# Patient Record
Sex: FEMALE | Race: WHITE | Employment: OTHER | URBAN - METROPOLITAN AREA
[De-identification: names, ages, dates, MRNs, and addresses within clinical notes are randomized per-mention and may not be internally consistent; named-entity substitution may affect disease eponyms.]

---

## 2017-12-04 ENCOUNTER — OFFICE VISIT (OUTPATIENT)
Dept: INTERNAL MEDICINE CLINIC | Age: 62
End: 2017-12-04

## 2017-12-04 VITALS
HEART RATE: 108 BPM | WEIGHT: 146 LBS | BODY MASS INDEX: 23.46 KG/M2 | OXYGEN SATURATION: 92 % | HEIGHT: 66 IN | DIASTOLIC BLOOD PRESSURE: 100 MMHG | SYSTOLIC BLOOD PRESSURE: 150 MMHG | TEMPERATURE: 98.6 F | RESPIRATION RATE: 18 BRPM

## 2017-12-04 DIAGNOSIS — M67.431 GANGLION OF RIGHT WRIST: ICD-10-CM

## 2017-12-04 DIAGNOSIS — R73.01 IFG (IMPAIRED FASTING GLUCOSE): ICD-10-CM

## 2017-12-04 DIAGNOSIS — Z12.31 ENCOUNTER FOR SCREENING MAMMOGRAM FOR MALIGNANT NEOPLASM OF BREAST: ICD-10-CM

## 2017-12-04 DIAGNOSIS — R03.0 ELEVATED BLOOD PRESSURE READING: ICD-10-CM

## 2017-12-04 DIAGNOSIS — M24.9 JOINT DERANGEMENT, HAND: ICD-10-CM

## 2017-12-04 DIAGNOSIS — M72.0 DUPUYTREN'S DISEASE OF PALM: ICD-10-CM

## 2017-12-04 DIAGNOSIS — Z00.00 PHYSICAL EXAM, ANNUAL: Primary | ICD-10-CM

## 2017-12-04 LAB
BILIRUB UR QL STRIP: NEGATIVE
GLUCOSE UR-MCNC: NEGATIVE MG/DL
KETONES P FAST UR STRIP-MCNC: NEGATIVE MG/DL
PH UR STRIP: 6 [PH] (ref 4.6–8)
PROT UR QL STRIP: NEGATIVE
SP GR UR STRIP: 1.02 (ref 1–1.03)
UA UROBILINOGEN AMB POC: NORMAL (ref 0.2–1)
URINALYSIS CLARITY POC: CLEAR
URINALYSIS COLOR POC: YELLOW
URINE BLOOD POC: NORMAL
URINE LEUKOCYTES POC: NEGATIVE
URINE NITRITES POC: NEGATIVE

## 2017-12-04 NOTE — PROGRESS NOTES
HISTORY OF PRESENT ILLNESS  Janet Montejo is a 65 y.o. female presents to establish care and for evaluation of the following  HPI  Former PCP, Neda Grissom MD    Here because of bilateral hand pain    Would like referral to Dr. Vanita Boswell for evaluation of self diagnosed Dupuytren's disorder of palms    Finger joints enlarge, was told it was arthritis     Colonoscopy remote, within 5 years, Jennifer Treviño, +polyp, father with colon cander    Mammogram , Dr. Raymond Yoon, last mammogram 2016, normal    August 2017 had flu vaccine    No shingles vacccine     Psychosocial: , nulligravida, MARTIR Secondary to endometosis, ovarinan cyst. Situational anxiety, no depression. Lives between [place] and [facility]. No tobacco, social alcohol    Right breast cyst removed, benign    Past Medical History:   Diagnosis Date    Arthritis     osteoporosis    Other ill-defined conditions(799.89)     high cholesterol    Psychiatric disorder     anxious     No current outpatient prescriptions on file prior to visit. No current facility-administered medications on file prior to visit. Past Surgical History:   Procedure Laterality Date    BREAST SURGERY PROCEDURE UNLISTED      HX GYN      hysterectomy     Past Surgical History:   Procedure Laterality Date    BREAST SURGERY PROCEDURE UNLISTED      HX GYN      hysterectomy     Review of Systems   Constitutional: Negative. Eyes:        Reading glasses   Respiratory: Negative. Cardiovascular: Negative for chest pain, palpitations, orthopnea and leg swelling. Genitourinary: Negative. Musculoskeletal:        Bilateral palmar pain   Skin: Negative. Neurological: Negative. Psychiatric/Behavioral: The patient is nervous/anxious (situational). Physical Exam   Constitutional: She appears well-developed and well-nourished. No distress.    HENT:   Right Ear: External ear normal.   Left Ear: External ear normal.   Nose: Nose normal. Mouth/Throat: Oropharynx is clear and moist. No oropharyngeal exudate. Eyes: Conjunctivae are normal. Right eye exhibits no discharge. Left eye exhibits no discharge. Neck: Normal range of motion. Neck supple. Cardiovascular: Normal rate, regular rhythm and normal heart sounds. Pulmonary/Chest: Effort normal and breath sounds normal. Right breast exhibits no inverted nipple, no mass, no nipple discharge, no skin change and no tenderness. Left breast exhibits no inverted nipple, no mass, no nipple discharge, no skin change and no tenderness. Breasts are symmetrical.       Abdominal: Soft. Bowel sounds are normal.   Musculoskeletal:        Hands:  Skin: She is not diaphoretic. ASSESSMENT and PLAN    ICD-10-CM ICD-9-CM    1. Physical exam, annual Z00.00 V70.0 LIPID PANEL      METABOLIC PANEL, COMPREHENSIVE      CBC WITH AUTOMATED DIFF   2. Dupuytren's disease of palm M72.0 728.6 REFERRAL TO ORTHOPEDIC SURGERY   3. Joint derangement, hand M24.9 718.94 RHEUMATOID FACTOR, QL      TSH RFX ON ABNORMAL TO FREE T4   4. IFG (impaired fasting glucose) R73.01 790.21 HEMOGLOBIN A1C WITH EAG      AMB POC URINALYSIS DIP STICK AUTO W/O MICRO   5. Encounter for screening mammogram for malignant neoplasm of breast Z12.31 V76.12 TOMMY MAMMO BI SCREENING INCL CAD   6. Elevated blood pressure reading R03.0 796.2    7. Ganglion of right wrist M67.431 727.41 REFERRAL TO ORTHOPEDIC SURGERY     Follow-up Disposition:  Return in about 4 weeks (around 1/1/2018) for blood pressure.   reviewed diet, exercise and weight control  use of aspirin to prevent MI and TIA's discussed

## 2017-12-04 NOTE — PROGRESS NOTES
RM#7  Chief Complaint   Patient presents with    Complete Physical     pain in both hands     1. Have you been to the ER, urgent care clinic since your last visit? Hospitalized since your last visit? No    2. Have you seen or consulted any other health care providers outside of the [address] [place] [address] since your last visit? Include any pap smears or colon screening. No  There are no preventive care reminders to display for this patient.

## 2017-12-04 NOTE — PATIENT INSTRUCTIONS
Dupuytren's Contracture: Care Instructions  Your Care Instructions    In Dupuytren's contracture, the fingers become stiff and curl toward the palm. It is caused by thick tissue that grows under the skin in the palm of the hand. Sometimes the condition affects the palm but not the fingers. If the tissue gets thicker and affects one or more fingers, it may limit movement of your fingers and hand. Sometimes the condition can occur in the soles of the feet. The cause of Dupuytren's disease is not known. Dupuytren's disease may get worse slowly. If you have mild Dupuytren's disease, you may be able to keep your fingers moving with regular stretching. Surgery usually helps in severe cases. However, Dupuytren's disease can come back. Follow-up care is a key part of your treatment and safety. Be sure to make and go to all appointments, and call your doctor if you are having problems. It's also a good idea to know your test results and keep a list of the medicines you take. How can you care for yourself at home? · Follow your doctor's advice for physical or occupational therapy and exercises to put your fingers and hand through a range of motion. · Two times a day, massage your hand and gently stretch the fingers back. This can get rid of tightness and help keep your fingers flexible. · Try to avoid curling your hand tightly. For example, use utensils andtools that have larger hand . When should you call for help? Call your doctor now or seek immediate medical care if:  ? · You have numbness in your fingers. ? · You have a wound or sore on your finger or palm. ? · Your hand or fingers get worse. ? Watch closely for changes in your health, and be sure to contact your doctor if you have any problems. Where can you learn more? Go to http://vance-regino.info/. Enter T883 in the search box to learn more about \"Dupuytren's Contracture: Care Instructions. \"  Current as of: March 21, 2017  Content Version: 11.4  © 1922-0123 [employer]. Care instructions adapted under license by [employer] (which disclaims liability or warranty for this information). If you have questions about a medical condition or this instruction, always ask your healthcare professional. Darrian Montoya any warranty or liability for your use of this information. Well Visit, Women 48 to 72: Care Instructions  Your Care Instructions    Physical exams can help you stay healthy. Your doctor has checked your overall health and may have suggested ways to take good care of yourself. He or she also may have recommended tests. At home, you can help prevent illness with healthy eating, regular exercise, and other steps. Follow-up care is a key part of your treatment and safety. Be sure to make and go to all appointments, and call your doctor if you are having problems. It's also a good idea to know your test results and keep a list of the medicines you take. How can you care for yourself at home? · Reach and stay at a healthy weight. This will lower your risk for many problems, such as obesity, diabetes, heart disease, and high blood pressure. · Get at least 30 minutes of exercise on most days of the week. Walking is a good choice. You also may want to do other activities, such as running, swimming, cycling, or playing tennis or team sports. · Do not smoke. Smoking can make health problems worse. If you need help quitting, talk to your doctor about stop-smoking programs and medicines. These can increase your chances of quitting for good. · Protect your skin from too much sun. When you're outdoors from 10 a.m. to 4 p.m., stay in the shade or cover up with clothing and a hat with a wide brim. Wear sunglasses that block UV rays. Even when it's cloudy, put broad-spectrum sunscreen (SPF 30 or higher) on any exposed skin.   · See a dentist one or two times a year for checkups and to have your teeth cleaned. · Wear a seat belt in the car. · Limit alcohol to 1 drink a day. Too much alcohol can cause health problems. Follow your doctor's advice about when to have certain tests. These tests can spot problems early. · Cholesterol. Your doctor will tell you how often to have this done based on your age, family history, or other things that can increase your risk for heart attack and stroke. · Blood pressure. Have your blood pressure checked during a routine doctor visit. Your doctor will tell you how often to check your blood pressure based on your age, your blood pressure results, and other factors. · Mammogram. Ask your doctor how often you should have a mammogram, which is an X-ray of your breasts. A mammogram can spot breast cancer before it can be felt and when it is easiest to treat. · Pap test and pelvic exam. Ask your doctor how often you should have a Pap test. You may not need to have a Pap test as often as you used to. · Vision. Have your eyes checked every year or two or as often as your doctor suggests. Some experts recommend that you have yearly exams for glaucoma and other age-related eye problems starting at age 48. · Hearing. Tell your doctor if you notice any change in your hearing. You can have tests to find out how well you hear. · Diabetes. Ask your doctor whether you should have tests for diabetes. · Colon cancer. You should begin tests for colon cancer at age 48. You may have one of several tests. Your doctor will tell you how often to have tests based on your age and risk. Risks include whether you already had a precancerous polyp removed from your colon or whether your parents, sisters and brothers, or children have had colon cancer. · Thyroid disease. Talk to your doctor about whether to have your thyroid checked as part of a regular physical exam. Women have an increased chance of a thyroid problem. · Osteoporosis. You should begin tests for bone density at age 72. If you are younger than 72, ask your doctor whether you have factors that may increase your risk for this disease. You may want to have this test before age 72. · Heart attack and stroke risk. At least every 4 to 6 years, you should have your risk for heart attack and stroke assessed. Your doctor uses factors such as your age, blood pressure, cholesterol, and whether you smoke or have diabetes to show what your risk for a heart attack or stroke is over the next 10 years. When should you call for help? Watch closely for changes in your health, and be sure to contact your doctor if you have any problems or symptoms that concern you. Where can you learn more? Go to http://vance-regino.info/. Enter E371 in the search box to learn more about \"Well Visit, Women 50 to 72: Care Instructions. \"  Current as of: May 12, 2017  Content Version: 11.4  © 5773-1919 [employer]. Care instructions adapted under license by [employer] (which disclaims liability or warranty for this information). If you have questions about a medical condition or this instruction, always ask your healthcare professional. [address] any warranty or liability for your use of this information.

## 2017-12-05 ENCOUNTER — TELEPHONE (OUTPATIENT)
Dept: INTERNAL MEDICINE CLINIC | Age: 62
End: 2017-12-05

## 2017-12-05 DIAGNOSIS — R74.8 ELEVATED LIVER ENZYMES: Primary | ICD-10-CM

## 2017-12-05 DIAGNOSIS — D75.89 MACROCYTOSIS: ICD-10-CM

## 2017-12-05 LAB
ALBUMIN SERPL-MCNC: 4.9 G/DL (ref 3.6–4.8)
ALBUMIN/GLOB SERPL: 1.6 {RATIO} (ref 1.2–2.2)
ALP SERPL-CCNC: 107 IU/L (ref 39–117)
ALT SERPL-CCNC: 46 IU/L (ref 0–32)
AST SERPL-CCNC: 74 IU/L (ref 0–40)
BASOPHILS # BLD AUTO: 0 X10E3/UL (ref 0–0.2)
BASOPHILS NFR BLD AUTO: 1 %
BILIRUB SERPL-MCNC: 0.5 MG/DL (ref 0–1.2)
BUN SERPL-MCNC: 9 MG/DL (ref 8–27)
BUN/CREAT SERPL: 16 (ref 12–28)
CALCIUM SERPL-MCNC: 9.8 MG/DL (ref 8.7–10.3)
CHLORIDE SERPL-SCNC: 97 MMOL/L (ref 96–106)
CHOLEST SERPL-MCNC: 289 MG/DL (ref 100–199)
CO2 SERPL-SCNC: 24 MMOL/L (ref 18–29)
CREAT SERPL-MCNC: 0.56 MG/DL (ref 0.57–1)
EOSINOPHIL # BLD AUTO: 0 X10E3/UL (ref 0–0.4)
EOSINOPHIL NFR BLD AUTO: 0 %
ERYTHROCYTE [DISTWIDTH] IN BLOOD BY AUTOMATED COUNT: 14.5 % (ref 12.3–15.4)
EST. AVERAGE GLUCOSE BLD GHB EST-MCNC: 105 MG/DL
GLOBULIN SER CALC-MCNC: 3 G/DL (ref 1.5–4.5)
GLUCOSE SERPL-MCNC: 95 MG/DL (ref 65–99)
HBA1C MFR BLD: 5.3 % (ref 4.8–5.6)
HCT VFR BLD AUTO: 46.4 % (ref 34–46.6)
HDLC SERPL-MCNC: 65 MG/DL
HGB BLD-MCNC: 15.8 G/DL (ref 11.1–15.9)
IMM GRANULOCYTES # BLD: 0 X10E3/UL (ref 0–0.1)
IMM GRANULOCYTES NFR BLD: 0 %
LDLC SERPL CALC-MCNC: ABNORMAL MG/DL (ref 0–99)
LYMPHOCYTES # BLD AUTO: 1.8 X10E3/UL (ref 0.7–3.1)
LYMPHOCYTES NFR BLD AUTO: 29 %
MCH RBC QN AUTO: 34.1 PG (ref 26.6–33)
MCHC RBC AUTO-ENTMCNC: 34.1 G/DL (ref 31.5–35.7)
MCV RBC AUTO: 100 FL (ref 79–97)
MONOCYTES # BLD AUTO: 0.4 X10E3/UL (ref 0.1–0.9)
MONOCYTES NFR BLD AUTO: 6 %
NEUTROPHILS # BLD AUTO: 4 X10E3/UL (ref 1.4–7)
NEUTROPHILS NFR BLD AUTO: 64 %
PLATELET # BLD AUTO: 303 X10E3/UL (ref 150–379)
POTASSIUM SERPL-SCNC: 3.9 MMOL/L (ref 3.5–5.2)
PROT SERPL-MCNC: 7.9 G/DL (ref 6–8.5)
RBC # BLD AUTO: 4.64 X10E6/UL (ref 3.77–5.28)
RHEUMATOID FACT SERPL-ACNC: 11.1 IU/ML (ref 0–13.9)
SODIUM SERPL-SCNC: 139 MMOL/L (ref 134–144)
TRIGL SERPL-MCNC: 992 MG/DL (ref 0–149)
TSH SERPL DL<=0.005 MIU/L-ACNC: 1.58 UIU/ML (ref 0.45–4.5)
VLDLC SERPL CALC-MCNC: ABNORMAL MG/DL (ref 5–40)
WBC # BLD AUTO: 6.2 X10E3/UL (ref 3.4–10.8)

## 2017-12-07 ENCOUNTER — TELEPHONE (OUTPATIENT)
Dept: INTERNAL MEDICINE CLINIC | Age: 62
End: 2017-12-07

## 2017-12-07 DIAGNOSIS — R74.8 ELEVATED LIVER ENZYMES: Primary | ICD-10-CM

## 2017-12-07 LAB
HAV IGM SERPL QL IA: NEGATIVE
HBV CORE IGM SERPL QL IA: NEGATIVE
HBV SURFACE AG SERPL QL IA: NEGATIVE
HCV AB S/CO SERPL IA: <0.1 S/CO RATIO (ref 0–0.9)
SPECIMEN STATUS REPORT, ROLRST: NORMAL
VIT B12 SERPL-MCNC: 674 PG/ML (ref 232–1245)

## 2017-12-07 NOTE — TELEPHONE ENCOUNTER
Please advise triglyceride cholesterol is very high, red blood cells are large and liver enzymes are elevated. Please ask how much alcohol she drinks. This could be a result of too much alcohol.    Liver ultrasound ordered

## 2017-12-08 NOTE — TELEPHONE ENCOUNTER
----- Message from Yevgeniy Wells sent at 12/8/2017  3:09 PM EST -----  Regarding: JOSELITO Blue/Telephone  Pt returned phone call.     Contact [phone]

## 2017-12-11 NOTE — TELEPHONE ENCOUNTER
Spoke with patient after verifying name and  regarding Carmen Blue's recommendations. Writer informed patient of Angelica Blue's recommendations. Patient given an opportunity to ask questions, repeated information, and verbalized understanding.

## 2017-12-14 ENCOUNTER — HOSPITAL ENCOUNTER (OUTPATIENT)
Dept: MAMMOGRAPHY | Age: 62
Discharge: HOME | End: 2017-12-14
Attending: NURSE PRACTITIONER
Payer: COMMERCIAL

## 2017-12-14 ENCOUNTER — HOSPITAL ENCOUNTER (OUTPATIENT)
Dept: ULTRASOUND IMAGING | Age: 62
Discharge: HOME | End: 2017-12-14
Attending: NURSE PRACTITIONER
Payer: COMMERCIAL

## 2017-12-14 DIAGNOSIS — Z12.31 ENCOUNTER FOR SCREENING MAMMOGRAM FOR MALIGNANT NEOPLASM OF BREAST: ICD-10-CM

## 2017-12-14 DIAGNOSIS — R74.8 ELEVATED LIVER ENZYMES: ICD-10-CM

## 2017-12-14 PROCEDURE — 77067 SCR MAMMO BI INCL CAD: CPT

## 2017-12-14 PROCEDURE — 76700 US EXAM ABDOM COMPLETE: CPT

## 2018-01-11 ENCOUNTER — TELEPHONE (OUTPATIENT)
Dept: INTERNAL MEDICINE CLINIC | Age: 63
End: 2018-01-11

## 2018-01-11 RX ORDER — ATORVASTATIN CALCIUM 20 MG/1
20 TABLET, FILM COATED ORAL DAILY
Qty: 30 TAB | Refills: 2 | Status: SHIPPED | OUTPATIENT
Start: 2018-01-11 | End: 2020-04-13

## 2018-01-11 NOTE — TELEPHONE ENCOUNTER
Spoke with patient after verifying name and  regarding Carmen Blue's recommendations. Writer informed patient of Niya Blue's recommendations. Patient stated that she doesn't want to take Lipitor due to it causing her to have diabetic symptoms. Patient would like to be prescribed a different medication. Patient given an opportunity to ask questions, repeated information, and verbalized understanding.

## 2020-04-13 ENCOUNTER — APPOINTMENT (OUTPATIENT)
Dept: GENERAL RADIOLOGY | Age: 65
DRG: 871 | End: 2020-04-13
Attending: STUDENT IN AN ORGANIZED HEALTH CARE EDUCATION/TRAINING PROGRAM
Payer: COMMERCIAL

## 2020-04-13 ENCOUNTER — APPOINTMENT (OUTPATIENT)
Dept: ULTRASOUND IMAGING | Age: 65
DRG: 871 | End: 2020-04-13
Attending: PHYSICIAN ASSISTANT
Payer: COMMERCIAL

## 2020-04-13 ENCOUNTER — HOSPITAL ENCOUNTER (INPATIENT)
Age: 65
LOS: 3 days | Discharge: HOME | DRG: 871 | End: 2020-04-16
Attending: EMERGENCY MEDICINE | Admitting: INTERNAL MEDICINE
Payer: COMMERCIAL

## 2020-04-13 ENCOUNTER — APPOINTMENT (OUTPATIENT)
Dept: CT IMAGING | Age: 65
DRG: 871 | End: 2020-04-13
Attending: INTERNAL MEDICINE
Payer: COMMERCIAL

## 2020-04-13 DIAGNOSIS — K92.0 HEMATEMESIS WITH NAUSEA: ICD-10-CM

## 2020-04-13 DIAGNOSIS — R50.9 ACUTE FEBRILE ILLNESS: ICD-10-CM

## 2020-04-13 DIAGNOSIS — K92.2 UPPER GI BLEED: Primary | ICD-10-CM

## 2020-04-13 PROBLEM — R11.2 NAUSEA & VOMITING: Status: ACTIVE | Noted: 2020-04-13

## 2020-04-13 PROBLEM — R79.89 ELEVATED LFTS: Status: ACTIVE | Noted: 2020-04-13

## 2020-04-13 PROBLEM — R19.7 NAUSEA VOMITING AND DIARRHEA: Status: ACTIVE | Noted: 2020-04-13

## 2020-04-13 PROBLEM — F10.10 ALCOHOL ABUSE: Status: ACTIVE | Noted: 2020-04-13

## 2020-04-13 LAB
ABO + RH BLD: NORMAL
ALBUMIN SERPL-MCNC: 3.4 G/DL (ref 3.5–5)
ALBUMIN/GLOB SERPL: 0.8 {RATIO} (ref 1.1–2.2)
ALP SERPL-CCNC: 148 U/L (ref 45–117)
ALT SERPL-CCNC: 68 U/L (ref 12–78)
ANION GAP SERPL CALC-SCNC: 6 MMOL/L (ref 5–15)
APPEARANCE UR: CLEAR
AST SERPL-CCNC: 122 U/L (ref 15–37)
BACTERIA URNS QL MICRO: NEGATIVE /HPF
BASOPHILS # BLD: 0 K/UL (ref 0–0.1)
BASOPHILS NFR BLD: 0 % (ref 0–1)
BILIRUB SERPL-MCNC: 1.4 MG/DL (ref 0.2–1)
BILIRUB UR QL CFM: POSITIVE
BLOOD GROUP ANTIBODIES SERPL: NORMAL
BUN SERPL-MCNC: 30 MG/DL (ref 6–20)
BUN/CREAT SERPL: 45 (ref 12–20)
CALCIUM SERPL-MCNC: 9.1 MG/DL (ref 8.5–10.1)
CHLORIDE SERPL-SCNC: 104 MMOL/L (ref 97–108)
CO2 SERPL-SCNC: 28 MMOL/L (ref 21–32)
COLOR UR: ABNORMAL
CREAT SERPL-MCNC: 0.67 MG/DL (ref 0.55–1.02)
CRP SERPL-MCNC: 1.51 MG/DL (ref 0–0.6)
DIFFERENTIAL METHOD BLD: ABNORMAL
EOSINOPHIL # BLD: 0 K/UL (ref 0–0.4)
EOSINOPHIL NFR BLD: 0 % (ref 0–7)
EPITH CASTS URNS QL MICRO: ABNORMAL /LPF
ERYTHROCYTE [DISTWIDTH] IN BLOOD BY AUTOMATED COUNT: 12.6 % (ref 11.5–14.5)
FERRITIN SERPL-MCNC: 260 NG/ML (ref 8–252)
GLOBULIN SER CALC-MCNC: 4.4 G/DL (ref 2–4)
GLUCOSE SERPL-MCNC: 166 MG/DL (ref 65–100)
GLUCOSE UR STRIP.AUTO-MCNC: NEGATIVE MG/DL
HCT VFR BLD AUTO: 34.8 % (ref 35–47)
HEMOCCULT STL QL: POSITIVE
HGB BLD-MCNC: 11.9 G/DL (ref 11.5–16)
HGB UR QL STRIP: ABNORMAL
HYALINE CASTS URNS QL MICRO: ABNORMAL /LPF (ref 0–5)
IMM GRANULOCYTES # BLD AUTO: 0 K/UL (ref 0–0.04)
IMM GRANULOCYTES NFR BLD AUTO: 0 % (ref 0–0.5)
INR PPP: 1.4 (ref 0.9–1.1)
KETONES UR QL STRIP.AUTO: ABNORMAL MG/DL
LACTATE SERPL-SCNC: 1.3 MMOL/L (ref 0.4–2)
LACTATE SERPL-SCNC: 2.3 MMOL/L (ref 0.4–2)
LDH SERPL L TO P-CCNC: 184 U/L (ref 81–246)
LEUKOCYTE ESTERASE UR QL STRIP.AUTO: ABNORMAL
LYMPHOCYTES # BLD: 0.9 K/UL (ref 0.8–3.5)
LYMPHOCYTES NFR BLD: 11 % (ref 12–49)
MCH RBC QN AUTO: 34.8 PG (ref 26–34)
MCHC RBC AUTO-ENTMCNC: 34.2 G/DL (ref 30–36.5)
MCV RBC AUTO: 101.8 FL (ref 80–99)
MONOCYTES # BLD: 0.9 K/UL (ref 0–1)
MONOCYTES NFR BLD: 12 % (ref 5–13)
NEUTS SEG # BLD: 5.9 K/UL (ref 1.8–8)
NEUTS SEG NFR BLD: 77 % (ref 32–75)
NITRITE UR QL STRIP.AUTO: NEGATIVE
NRBC # BLD: 0 K/UL (ref 0–0.01)
NRBC BLD-RTO: 0 PER 100 WBC
PH UR STRIP: 7 [PH] (ref 5–8)
PLATELET # BLD AUTO: 135 K/UL (ref 150–400)
PMV BLD AUTO: 11 FL (ref 8.9–12.9)
POTASSIUM SERPL-SCNC: 3.1 MMOL/L (ref 3.5–5.1)
PROCALCITONIN SERPL-MCNC: 0.46 NG/ML
PROT SERPL-MCNC: 7.8 G/DL (ref 6.4–8.2)
PROT UR STRIP-MCNC: ABNORMAL MG/DL
PROTHROMBIN TIME: 14 SEC (ref 9–11.1)
RBC # BLD AUTO: 3.42 M/UL (ref 3.8–5.2)
RBC #/AREA URNS HPF: ABNORMAL /HPF (ref 0–5)
SODIUM SERPL-SCNC: 138 MMOL/L (ref 136–145)
SP GR UR REFRACTOMETRY: 1.03 (ref 1–1.03)
SPECIMEN EXP DATE BLD: NORMAL
TROPONIN I SERPL-MCNC: <0.05 NG/ML
UROBILINOGEN UR QL STRIP.AUTO: 1 EU/DL (ref 0.2–1)
WBC # BLD AUTO: 7.8 K/UL (ref 3.6–11)
WBC URNS QL MICRO: ABNORMAL /HPF (ref 0–4)

## 2020-04-13 PROCEDURE — 96361 HYDRATE IV INFUSION ADD-ON: CPT

## 2020-04-13 PROCEDURE — 36415 COLL VENOUS BLD VENIPUNCTURE: CPT

## 2020-04-13 PROCEDURE — 74177 CT ABD & PELVIS W/CONTRAST: CPT

## 2020-04-13 PROCEDURE — 80053 COMPREHEN METABOLIC PANEL: CPT

## 2020-04-13 PROCEDURE — 94761 N-INVAS EAR/PLS OXIMETRY MLT: CPT

## 2020-04-13 PROCEDURE — 87635 SARS-COV-2 COVID-19 AMP PRB: CPT

## 2020-04-13 PROCEDURE — 74011250636 HC RX REV CODE- 250/636: Performed by: INTERNAL MEDICINE

## 2020-04-13 PROCEDURE — 83605 ASSAY OF LACTIC ACID: CPT

## 2020-04-13 PROCEDURE — 99285 EMERGENCY DEPT VISIT HI MDM: CPT

## 2020-04-13 PROCEDURE — 86900 BLOOD TYPING SEROLOGIC ABO: CPT

## 2020-04-13 PROCEDURE — 74011250636 HC RX REV CODE- 250/636: Performed by: STUDENT IN AN ORGANIZED HEALTH CARE EDUCATION/TRAINING PROGRAM

## 2020-04-13 PROCEDURE — 76700 US EXAM ABDOM COMPLETE: CPT

## 2020-04-13 PROCEDURE — 82272 OCCULT BLD FECES 1-3 TESTS: CPT

## 2020-04-13 PROCEDURE — 74011250636 HC RX REV CODE- 250/636: Performed by: EMERGENCY MEDICINE

## 2020-04-13 PROCEDURE — 86140 C-REACTIVE PROTEIN: CPT

## 2020-04-13 PROCEDURE — C9113 INJ PANTOPRAZOLE SODIUM, VIA: HCPCS | Performed by: EMERGENCY MEDICINE

## 2020-04-13 PROCEDURE — 74011250637 HC RX REV CODE- 250/637: Performed by: INTERNAL MEDICINE

## 2020-04-13 PROCEDURE — 89055 LEUKOCYTE ASSESSMENT FECAL: CPT

## 2020-04-13 PROCEDURE — 84484 ASSAY OF TROPONIN QUANT: CPT

## 2020-04-13 PROCEDURE — 85025 COMPLETE CBC W/AUTO DIFF WBC: CPT

## 2020-04-13 PROCEDURE — 74011000258 HC RX REV CODE- 258: Performed by: STUDENT IN AN ORGANIZED HEALTH CARE EDUCATION/TRAINING PROGRAM

## 2020-04-13 PROCEDURE — 81001 URINALYSIS AUTO W/SCOPE: CPT

## 2020-04-13 PROCEDURE — 82728 ASSAY OF FERRITIN: CPT

## 2020-04-13 PROCEDURE — 87506 IADNA-DNA/RNA PROBE TQ 6-11: CPT

## 2020-04-13 PROCEDURE — 83615 LACTATE (LD) (LDH) ENZYME: CPT

## 2020-04-13 PROCEDURE — 84145 PROCALCITONIN (PCT): CPT

## 2020-04-13 PROCEDURE — 71045 X-RAY EXAM CHEST 1 VIEW: CPT

## 2020-04-13 PROCEDURE — 85610 PROTHROMBIN TIME: CPT

## 2020-04-13 PROCEDURE — 74011000250 HC RX REV CODE- 250: Performed by: INTERNAL MEDICINE

## 2020-04-13 PROCEDURE — C9113 INJ PANTOPRAZOLE SODIUM, VIA: HCPCS | Performed by: INTERNAL MEDICINE

## 2020-04-13 PROCEDURE — 96365 THER/PROPH/DIAG IV INF INIT: CPT

## 2020-04-13 PROCEDURE — 96375 TX/PRO/DX INJ NEW DRUG ADDON: CPT

## 2020-04-13 PROCEDURE — 65660000001 HC RM ICU INTERMED STEPDOWN

## 2020-04-13 PROCEDURE — 74011636320 HC RX REV CODE- 636/320: Performed by: EMERGENCY MEDICINE

## 2020-04-13 PROCEDURE — 87040 BLOOD CULTURE FOR BACTERIA: CPT

## 2020-04-13 PROCEDURE — 87177 OVA AND PARASITES SMEARS: CPT

## 2020-04-13 PROCEDURE — 0107U C DIFF TOX AG DETCJ IA STOOL: CPT

## 2020-04-13 RX ORDER — ACETAMINOPHEN 325 MG/1
650 TABLET ORAL
Status: DISCONTINUED | OUTPATIENT
Start: 2020-04-13 | End: 2020-04-16 | Stop reason: HOSPADM

## 2020-04-13 RX ORDER — LORAZEPAM 2 MG/ML
4 INJECTION INTRAMUSCULAR
Status: DISCONTINUED | OUTPATIENT
Start: 2020-04-13 | End: 2020-04-16 | Stop reason: HOSPADM

## 2020-04-13 RX ORDER — SODIUM CHLORIDE 0.9 % (FLUSH) 0.9 %
10 SYRINGE (ML) INJECTION
Status: COMPLETED | OUTPATIENT
Start: 2020-04-13 | End: 2020-04-13

## 2020-04-13 RX ORDER — ACETAMINOPHEN 325 MG/1
325 TABLET ORAL
COMMUNITY
End: 2020-04-22

## 2020-04-13 RX ORDER — METRONIDAZOLE 500 MG/100ML
500 INJECTION, SOLUTION INTRAVENOUS EVERY 8 HOURS
Status: DISCONTINUED | OUTPATIENT
Start: 2020-04-13 | End: 2020-04-15

## 2020-04-13 RX ORDER — ONDANSETRON 2 MG/ML
4 INJECTION INTRAMUSCULAR; INTRAVENOUS
Status: DISCONTINUED | OUTPATIENT
Start: 2020-04-13 | End: 2020-04-16 | Stop reason: HOSPADM

## 2020-04-13 RX ORDER — SODIUM CHLORIDE 9 MG/ML
150 INJECTION, SOLUTION INTRAVENOUS ONCE
Status: DISPENSED | OUTPATIENT
Start: 2020-04-13 | End: 2020-04-14

## 2020-04-13 RX ORDER — BARIUM SULFATE 20 MG/ML
900 SUSPENSION ORAL
Status: DISPENSED | OUTPATIENT
Start: 2020-04-13 | End: 2020-04-14

## 2020-04-13 RX ORDER — SODIUM CHLORIDE 0.9 % (FLUSH) 0.9 %
5-40 SYRINGE (ML) INJECTION EVERY 8 HOURS
Status: DISCONTINUED | OUTPATIENT
Start: 2020-04-13 | End: 2020-04-16 | Stop reason: HOSPADM

## 2020-04-13 RX ORDER — PANTOPRAZOLE SODIUM 40 MG/10ML
40 INJECTION, POWDER, LYOPHILIZED, FOR SOLUTION INTRAVENOUS
Status: COMPLETED | OUTPATIENT
Start: 2020-04-13 | End: 2020-04-13

## 2020-04-13 RX ORDER — SODIUM CHLORIDE 9 MG/ML
100 INJECTION, SOLUTION INTRAVENOUS CONTINUOUS
Status: DISCONTINUED | OUTPATIENT
Start: 2020-04-13 | End: 2020-04-14

## 2020-04-13 RX ORDER — ONDANSETRON 2 MG/ML
4 INJECTION INTRAMUSCULAR; INTRAVENOUS
Status: COMPLETED | OUTPATIENT
Start: 2020-04-13 | End: 2020-04-13

## 2020-04-13 RX ORDER — FAMOTIDINE 20 MG/1
20 TABLET, FILM COATED ORAL
COMMUNITY
End: 2020-04-16

## 2020-04-13 RX ORDER — ASCORBIC ACID 500 MG
1000 TABLET ORAL DAILY
COMMUNITY

## 2020-04-13 RX ORDER — ASPIRIN 81 MG/1
81 TABLET ORAL
COMMUNITY
End: 2020-04-16

## 2020-04-13 RX ORDER — SODIUM CHLORIDE 0.9 % (FLUSH) 0.9 %
5-40 SYRINGE (ML) INJECTION AS NEEDED
Status: DISCONTINUED | OUTPATIENT
Start: 2020-04-13 | End: 2020-04-16 | Stop reason: HOSPADM

## 2020-04-13 RX ORDER — CHOLECALCIFEROL (VITAMIN D3) 125 MCG
CAPSULE ORAL DAILY
COMMUNITY

## 2020-04-13 RX ORDER — ACETAMINOPHEN 650 MG/1
650 SUPPOSITORY RECTAL
Status: DISCONTINUED | OUTPATIENT
Start: 2020-04-13 | End: 2020-04-16 | Stop reason: HOSPADM

## 2020-04-13 RX ORDER — ACETAMINOPHEN 325 MG/1
650 TABLET ORAL
Status: DISCONTINUED | OUTPATIENT
Start: 2020-04-13 | End: 2020-04-13

## 2020-04-13 RX ORDER — POTASSIUM CHLORIDE 7.45 MG/ML
10 INJECTION INTRAVENOUS
Status: COMPLETED | OUTPATIENT
Start: 2020-04-13 | End: 2020-04-13

## 2020-04-13 RX ORDER — LORAZEPAM 2 MG/ML
2 INJECTION INTRAMUSCULAR
Status: DISCONTINUED | OUTPATIENT
Start: 2020-04-13 | End: 2020-04-16 | Stop reason: HOSPADM

## 2020-04-13 RX ADMIN — ACETAMINOPHEN 650 MG: 325 TABLET, FILM COATED ORAL at 19:02

## 2020-04-13 RX ADMIN — CEFTRIAXONE SODIUM 2 G: 2 INJECTION, POWDER, FOR SOLUTION INTRAMUSCULAR; INTRAVENOUS at 14:27

## 2020-04-13 RX ADMIN — SODIUM CHLORIDE 40 MG: 9 INJECTION INTRAMUSCULAR; INTRAVENOUS; SUBCUTANEOUS at 22:12

## 2020-04-13 RX ADMIN — SODIUM CHLORIDE 1000 ML: 900 INJECTION, SOLUTION INTRAVENOUS at 14:13

## 2020-04-13 RX ADMIN — POTASSIUM CHLORIDE 10 MEQ: 10 INJECTION, SOLUTION INTRAVENOUS at 22:45

## 2020-04-13 RX ADMIN — POTASSIUM CHLORIDE 10 MEQ: 10 INJECTION, SOLUTION INTRAVENOUS at 17:40

## 2020-04-13 RX ADMIN — SODIUM CHLORIDE 100 ML/HR: 900 INJECTION, SOLUTION INTRAVENOUS at 17:26

## 2020-04-13 RX ADMIN — POTASSIUM CHLORIDE 10 MEQ: 10 INJECTION, SOLUTION INTRAVENOUS at 18:58

## 2020-04-13 RX ADMIN — Medication 10 ML: at 22:13

## 2020-04-13 RX ADMIN — ONDANSETRON 4 MG: 2 INJECTION INTRAMUSCULAR; INTRAVENOUS at 14:12

## 2020-04-13 RX ADMIN — Medication 10 ML: at 19:03

## 2020-04-13 RX ADMIN — METRONIDAZOLE 500 MG: 500 INJECTION, SOLUTION INTRAVENOUS at 19:00

## 2020-04-13 RX ADMIN — POTASSIUM CHLORIDE 10 MEQ: 10 INJECTION, SOLUTION INTRAVENOUS at 20:17

## 2020-04-13 RX ADMIN — PANTOPRAZOLE SODIUM 40 MG: 40 INJECTION, POWDER, FOR SOLUTION INTRAVENOUS at 14:12

## 2020-04-13 RX ADMIN — IOPAMIDOL 100 ML: 755 INJECTION, SOLUTION INTRAVENOUS at 22:00

## 2020-04-13 NOTE — PROGRESS NOTES
Nurse Clarification of the Prior to Admission Medication Regimen     The patient was interviewed regarding clarification of the prior to admission medication regimen. The individual(s) listed above were questioned regarding the patient's use of any other inhalers, topical products, over the counter medications, herbal medications, vitamin products or ophthalmic/nasal/otic medication use. Information Obtained From: patient    Recommendations/Findings: The following amendments were made to the patient's active medication list on file at [facility]:     1) Additions:    multivitamins po every day prn   Vitamin c 1000 mg po prn qd   Vitamin d3 po prn every day   Vitamin k po every day prn   pepcid ac every day prn    Tylenol 325 mg po q4h prn   Aspirin 81 mg po q6 hr prn. 2) Removals:    none    3) Changes:   none       PTA medication list was corrected to the following:     Prior to Admission Medications   Prescriptions Last Dose Informant Taking? MULTIVITAMIN PO   Yes   Sig: Take  by mouth daily as needed. acetaminophen (TylenoL) 325 mg tablet   Yes   Sig: Take 325 mg by mouth every four (4) hours as needed for Pain. ascorbic acid, vitamin C, (Vitamin C) 500 mg tablet   Yes   Sig: Take 1,000 mg by mouth daily. aspirin delayed-release 81 mg tablet   Yes   Sig: Take 81 mg by mouth daily as needed for Pain. cholecalciferol, vitamin D3, (Vitamin D3) 50 mcg (2,000 unit) tab   Yes   Sig: Take  by mouth daily as needed. famotidine (Pepcid AC) 20 mg tablet   Yes   Sig: Take 20 mg by mouth daily as needed for Heartburn. Indications: heartburn   phytonadione, vit K1, (VITAMIN K1)   Yes   Sig: by Does Not Apply route daily as needed.       Facility-Administered Medications: None        Thank you,  Joyce Fontaine

## 2020-04-13 NOTE — PROGRESS NOTES
Bedside and Verbal shift change report GIVEN TO SUBHA lynn. Report included the following information SBAR, Kardex, ED Summary, Procedure Summary, Intake/Output, MAR and Recent Results. 645 pm: pt arrived to unit and was tucked in.

## 2020-04-13 NOTE — PROGRESS NOTES
TRANSFER - IN REPORT:    Verbal report received from rocío (name) on Paul Mean  being received from ED (unit) for routine progression of care      Report consisted of patients Situation, Background, Assessment and   Recommendations(SBAR). Information from the following report(s) SBAR, Kardex, ED Summary, Procedure Summary, Intake/Output, MAR and Recent Results was reviewed with the receiving nurse. Opportunity for questions and clarification was provided. Assessment completed upon patients arrival to unit and care assumed.

## 2020-04-13 NOTE — PROGRESS NOTES
04/13/20 1855   Vital Signs   Temp 100.4 °F (38 °C)   Temp Source Oral   Pulse (Heart Rate) (!) 119   Resp Rate 21   O2 Sat (%) 97 %   Level of Consciousness Alert   /84   MAP (Calculated) 106   MEWS Score 4   tach and temp elevating MEWs. Will monitor for increase in vitals, as this tach is a decrease. Will medicate for fever.

## 2020-04-13 NOTE — PROGRESS NOTES
Primary Nurse Sara Sam RN and yves RN performed a dual skin assessment on this patient Impairment noted- see wound doc flow sheet    Psoriasis    Jose G score is 19

## 2020-04-13 NOTE — ED PROVIDER NOTES
EMERGENCY DEPARTMENT HISTORY AND PHYSICAL EXAM          Date: 4/13/2020  Patient Name: Valentino Riling  Attending of Record: Dr. Jack Bautista    History of Presenting Illness     Chief Complaint   Patient presents with    Vomiting       History Provided By: Patient    HPI: Valentino Riling is a 62 y.o. female, pmhx significant for hiatal hernia, hyperlipidemia, and prediabetes, who presents via triage to the ED c/o hematemesis. Patient started having nausea, vomiting and diarrhea yesterday morning. Around 9 PM, had one episode of hematemesis. Today, has had 2 more episodes of hematemesis. Vomitus consists of bright red blood and blood clots. Additionally, during his period has had very dark stools. Associated symptoms of tachycardia diaphoresis, and paleness. Denies any syncope or presyncope, sensation of dizziness or lightheadedness, chest pain, abdominal pain, history of easy bruising. patient does not take any anticoagulants. PCP: Dea Smith NP    There are no other complaints, changes, or physical findings at this time. Current Facility-Administered Medications   Medication Dose Route Frequency Provider Last Rate Last Dose    0.9% sodium chloride infusion  150 mL/hr IntraVENous ONCE Lion Monzon MD         Current Outpatient Medications   Medication Sig Dispense Refill    MULTIVITAMIN PO Take  by mouth daily as needed.  ascorbic acid, vitamin C, (Vitamin C) 500 mg tablet Take 1,000 mg by mouth daily.  cholecalciferol, vitamin D3, (Vitamin D3) 50 mcg (2,000 unit) tab Take  by mouth daily as needed.  phytonadione, vit K1, (VITAMIN K1) by Does Not Apply route daily as needed.  famotidine (Pepcid AC) 20 mg tablet Take 20 mg by mouth daily as needed for Heartburn. Indications: heartburn      acetaminophen (TylenoL) 325 mg tablet Take 325 mg by mouth every four (4) hours as needed for Pain.       aspirin delayed-release 81 mg tablet Take 81 mg by mouth daily as needed for Pain. Past History     Past Medical History:  Past Medical History:   Diagnosis Date    Arthritis     osteoporosis    Other ill-defined conditions(799.89)     high cholesterol    Psychiatric disorder     anxious       Past Surgical History:  Past Surgical History:   Procedure Laterality Date    BREAST SURGERY PROCEDURE UNLISTED      HX BREAST BIOPSY Right     at age 25   [de-identified] GYN      hysterectomy    HX HYSTERECTOMY      HX OOPHORECTOMY         Family History:  Family History   Problem Relation Age of Onset   Gloriajean Seeds Stroke Mother    Jb Momin Mother     Glaucoma Mother     Colon Cancer Father         64    Hypertension Brother     Breast Cancer Paternal Aunt        Social History:  Social History     Tobacco Use    Smoking status: Never Smoker    Smokeless tobacco: Never Used   Substance Use Topics    Alcohol use: Yes     Comment: social    Drug use: No       Allergies: Allergies   Allergen Reactions    Amoxicillin Hives         Review of Systems   Review of Systems   Constitutional: Positive for chills, diaphoresis, fatigue and fever. Respiratory: Negative for shortness of breath and wheezing. Cardiovascular: Negative for chest pain and leg swelling. Gastrointestinal: Positive for blood in stool, diarrhea, nausea and vomiting. Negative for abdominal distention and abdominal pain. Hematemesis   Genitourinary: Negative for decreased urine volume and dysuria. Skin: Positive for color change and pallor. Negative for rash. Neurological: Negative for dizziness, syncope and light-headedness. Physical Exam   Physical Exam  Vitals signs reviewed. Constitutional:       General: She is not in acute distress. Appearance: She is diaphoretic. She is not toxic-appearing. HENT:      Head: Normocephalic. Nose: No congestion.       Mouth/Throat:      Mouth: Mucous membranes are moist.      Comments: No central pallor  Eyes:      Comments: Pale conjunctiva   Neck: Musculoskeletal: No neck rigidity. Cardiovascular:      Rate and Rhythm: Regular rhythm. Tachycardia present. Pulses: Normal pulses. Pulmonary:      Effort: Pulmonary effort is normal.      Breath sounds: Normal breath sounds. Abdominal:      General: Abdomen is flat. Bowel sounds are normal.      Palpations: Abdomen is soft. Musculoskeletal:         General: No swelling. Skin:     Capillary Refill: Capillary refill takes 2 to 3 seconds. Coloration: Skin is pale. Skin is not jaundiced. Findings: No bruising. Neurological:      General: No focal deficit present. Mental Status: She is alert and oriented to person, place, and time. Psychiatric:         Mood and Affect: Mood normal.         Diagnostic Study Results     Labs -     Recent Results (from the past 12 hour(s))   CBC WITH AUTOMATED DIFF    Collection Time: 04/13/20  1:56 PM   Result Value Ref Range    WBC 7.8 3.6 - 11.0 K/uL    RBC 3.42 (L) 3.80 - 5.20 M/uL    HGB 11.9 11.5 - 16.0 g/dL    HCT 34.8 (L) 35.0 - 47.0 %    .8 (H) 80.0 - 99.0 FL    MCH 34.8 (H) 26.0 - 34.0 PG    MCHC 34.2 30.0 - 36.5 g/dL    RDW 12.6 11.5 - 14.5 %    PLATELET 327 (L) 115 - 400 K/uL    MPV 11.0 8.9 - 12.9 FL    NRBC 0.0 0  WBC    ABSOLUTE NRBC 0.00 0.00 - 0.01 K/uL    NEUTROPHILS 77 (H) 32 - 75 %    LYMPHOCYTES 11 (L) 12 - 49 %    MONOCYTES 12 5 - 13 %    EOSINOPHILS 0 0 - 7 %    BASOPHILS 0 0 - 1 %    IMMATURE GRANULOCYTES 0 0.0 - 0.5 %    ABS. NEUTROPHILS 5.9 1.8 - 8.0 K/UL    ABS. LYMPHOCYTES 0.9 0.8 - 3.5 K/UL    ABS. MONOCYTES 0.9 0.0 - 1.0 K/UL    ABS. EOSINOPHILS 0.0 0.0 - 0.4 K/UL    ABS. BASOPHILS 0.0 0.0 - 0.1 K/UL    ABS. IMM.  GRANS. 0.0 0.00 - 0.04 K/UL    DF AUTOMATED     METABOLIC PANEL, COMPREHENSIVE    Collection Time: 04/13/20  1:56 PM   Result Value Ref Range    Sodium 138 136 - 145 mmol/L    Potassium 3.1 (L) 3.5 - 5.1 mmol/L    Chloride 104 97 - 108 mmol/L    CO2 28 21 - 32 mmol/L    Anion gap 6 5 - 15 mmol/L Glucose 166 (H) 65 - 100 mg/dL    BUN 30 (H) 6 - 20 MG/DL    Creatinine 0.67 0.55 - 1.02 MG/DL    BUN/Creatinine ratio 45 (H) 12 - 20      GFR est AA >60 >60 ml/min/1.73m2    GFR est non-AA >60 >60 ml/min/1.73m2    Calcium 9.1 8.5 - 10.1 MG/DL    Bilirubin, total 1.4 (H) 0.2 - 1.0 MG/DL    ALT (SGPT) 68 12 - 78 U/L    AST (SGOT) 122 (H) 15 - 37 U/L    Alk. phosphatase 148 (H) 45 - 117 U/L    Protein, total 7.8 6.4 - 8.2 g/dL    Albumin 3.4 (L) 3.5 - 5.0 g/dL    Globulin 4.4 (H) 2.0 - 4.0 g/dL    A-G Ratio 0.8 (L) 1.1 - 2.2     TYPE & SCREEN    Collection Time: 04/13/20  1:56 PM   Result Value Ref Range    Crossmatch Expiration 04/16/2020     ABO/Rh(D) A POSITIVE     Antibody screen NEG    PROTHROMBIN TIME + INR    Collection Time: 04/13/20  2:05 PM   Result Value Ref Range    INR 1.4 (H) 0.9 - 1.1      Prothrombin time 14.0 (H) 9.0 - 11.1 sec   URINALYSIS W/ RFLX MICROSCOPIC    Collection Time: 04/13/20  2:05 PM   Result Value Ref Range    Color DARK YELLOW      Appearance CLEAR CLEAR      Specific gravity 1.026 1.003 - 1.030      pH (UA) 7.0 5.0 - 8.0      Protein TRACE (A) NEG mg/dL    Glucose Negative NEG mg/dL    Ketone TRACE (A) NEG mg/dL    Blood TRACE (A) NEG      Urobilinogen 1.0 0.2 - 1.0 EU/dL    Nitrites Negative NEG      Leukocyte Esterase MODERATE (A) NEG      WBC 5-10 0 - 4 /hpf    RBC 5-10 0 - 5 /hpf    Epithelial cells FEW FEW /lpf    Bacteria Negative NEG /hpf    Hyaline cast 2-5 0 - 5 /lpf   BILIRUBIN, CONFIRM    Collection Time: 04/13/20  2:05 PM   Result Value Ref Range    Bilirubin UA, confirm Positive (A) NEG     LACTIC ACID    Collection Time: 04/13/20  2:17 PM   Result Value Ref Range    Lactic acid 2.3 (HH) 0.4 - 2.0 MMOL/L   OCCULT BLOOD, STOOL    Collection Time: 04/13/20  2:18 PM   Result Value Ref Range    Occult blood, stool Positive (A) NEG         Radiologic Studies -   XR CHEST PORT   Final Result   IMPRESSION: No evidence of active intrathoracic disease.         CT Results  (Last 48 hours)    None        CXR Results  (Last 48 hours)               04/13/20 1531  XR CHEST PORT Final result    Impression:  IMPRESSION: No evidence of active intrathoracic disease. Narrative:  Portable chest single view dated 4/13/2020       Comparison chest dated 2/3/2014       History is hemoptysis and fever       A single frontal view of the chest was obtained. The cardiac silhouette is   normal in size. There is no evidence of active lung disease. Medical Decision Making   I am the first provider for this patient. I reviewed the vital signs, available nursing notes, past medical history, past surgical history, family history and social history. Vital Signs-Reviewed the patient's vital signs. Patient Vitals for the past 12 hrs:   Temp Pulse Resp BP SpO2   04/13/20 1430 -- (!) 123 25 146/88 96 %   04/13/20 1403 (!) 100.5 °F (38.1 °C) -- -- -- --   04/13/20 1400 -- (!) 149 20 (!) 155/96 96 %   04/13/20 1339 98.6 °F (37 °C) (!) 138 12 (!) 166/98 --       Records Reviewed: Old Medical Records    Provider Notes (Medical Decision Making):     DDx: Gastroenteritis, gastritis, Jen-Vaughan tear, bleeding peptic ulcer, esophageal varices, malignancy      Patient is diaphoretic tachycardic, febrile, and pale appearing. No syncope or presyncope. Ordered broad laboratory work-up including type and screen, CBC, CMP. Will give pantoprazole, Zofran, and ceftriaxone empirically. Patient will need admission to the hospital.  Further management pending initial results. Will attempt to obtain information regarding who her outpatient gastroenterologist is to contact prior to admission. 1550: Comfortably in no acute distress. I spoke with hospitalist, who agrees with need admission. Gastroenterology is at bedside. ED Course and Progress Notes:   Initial assessment performed.  The patients presenting problems have been discussed, and they are in agreement with the care plan formulated and outlined with them. I have encouraged them to ask questions as they arise throughout their visit. Diagnosis     Clinical Impression:   1. Upper GI bleed    2. Hematemesis with nausea    3. Acute febrile illness          Disposition:  admit    PLAN:  1. Current Discharge Medication List        2. Follow-up Information    None       Return to ED if worse         ORLANDO Nguyen MD

## 2020-04-13 NOTE — CONSULTS
Gastroenterology Consultation Note  MARIIA Chiang   for Dr. Amy Li    NAME: Nikki Reid : 1955 MRN: [id number]   ATTG: TBD PCP: Fabian Jane NP  Date/Time:  2020 3:08 PM  Subjective:   REASON FOR CONSULT:      Nikki Reid is a 62 y.o.  female who I was asked to see for hematemesis. She reports this all started yesterday morning. She admits to nausea and vomiting after eating [employer]. However she was ill prior and has been laying in bed for days, reports consatent diarrhea. Bleeding started at midnight, BRB that was a clot/gillian that started at midnight. At 9:30 AM more blood came up and was more brown appearing and not as bright. She admits to chills and diaphoresis. She has a little SOB today prompting 911 call. Some mucous production and taking Cleratin for allergies. Has arthritis, no myalgias. She takes a probiotic, liquid. Two spoon fulls BID. She used to take Naproxen 10+ years ago, no other NSAID use. The diarrhea started yesterday morning. Just finally calmed down,  It is dark brown/black appearing, that started after hematemesis. No sick contacts or recent travel. No known contacts with COVID-19. She has been trying to keep 6 ft distance and limiting contacts with others. She had contact with EMS a week ago after her  had a fall. No abx use, county water but drinks bottled water. Hgb is stable at 11.9 but lower than baseline of 14-15, .8, plt 135, bili elevated at 1.4, INR 1.4. CXR is unremarkable. EGD by Dr. Peg Moore 3/11/14: Impressions:    Normal mucosa in the second part of the duodenum and duodenal bulb. (Biopsy). Normal mucosa in the stomach. (DEEPTI-Test). Mucosa suggestive of Parks's esophagus (Biopsy). Hiatal Hernia in the cardia. Path: mild reflux changes, no Parks's    Her last colonoscopy was a year ago by Dr. Sigrid Trivedi.  She thought she had one more recently with polyps however nothing leatha in [facility]. Her farther had colon cancer in late 40s-50s. He had exposures in [employer] to biological warfare. He also had polyps. No other family hx of stomach or esophageal cancer. No tobacco use, she reports drinking more than usual recently. She was drinking one glass a day of wine. Now drinking 5-6 glasses of Maker's a day over the last 4-6 weeks. She has gone months without drinking, no withdrawals in the past.  She attributes drinking to COVID-19 outbreak. Past Medical History:   Diagnosis Date    Arthritis     osteoporosis    Other ill-defined conditions(979.89)     high cholesterol    Psychiatric disorder     anxious      Past Surgical History:   Procedure Laterality Date    BREAST SURGERY PROCEDURE UNLISTED      HX BREAST BIOPSY Right     at age 25   Revonda Do GYN      hysterectomy    HX HYSTERECTOMY      HX OOPHORECTOMY       Social History     Tobacco Use    Smoking status: Never Smoker    Smokeless tobacco: Never Used   Substance Use Topics    Alcohol use: Yes     Comment: social      Family History   Problem Relation Age of Onset    Stroke Mother    Marlyne Miu Mother     Glaucoma Mother     Colon Cancer Father         64    Hypertension Brother     Breast Cancer Paternal Aunt       Allergies   Allergen Reactions    Amoxicillin Hives      Home Medications:  Prior to Admission Medications   Prescriptions Last Dose Informant Patient Reported? Taking? MULTIVITAMIN PO   Yes Yes   Sig: Take  by mouth daily as needed. acetaminophen (TylenoL) 325 mg tablet   Yes Yes   Sig: Take 325 mg by mouth every four (4) hours as needed for Pain. ascorbic acid, vitamin C, (Vitamin C) 500 mg tablet   Yes Yes   Sig: Take 1,000 mg by mouth daily. aspirin delayed-release 81 mg tablet   Yes Yes   Sig: Take 81 mg by mouth daily as needed for Pain. cholecalciferol, vitamin D3, (Vitamin D3) 50 mcg (2,000 unit) tab   Yes Yes   Sig: Take  by mouth daily as needed.    famotidine (Pepcid AC) 20 mg tablet   Yes Yes   Sig: Take 20 mg by mouth daily as needed for Heartburn. Indications: heartburn   phytonadione, vit K1, (VITAMIN K1)   Yes Yes   Sig: by Does Not Apply route daily as needed. Facility-Administered Medications: None     Hospital medications:  Current Facility-Administered Medications   Medication Dose Route Frequency    0.9% sodium chloride infusion  150 mL/hr IntraVENous ONCE     Current Outpatient Medications   Medication Sig    MULTIVITAMIN PO Take  by mouth daily as needed.  ascorbic acid, vitamin C, (Vitamin C) 500 mg tablet Take 1,000 mg by mouth daily.  cholecalciferol, vitamin D3, (Vitamin D3) 50 mcg (2,000 unit) tab Take  by mouth daily as needed.  phytonadione, vit K1, (VITAMIN K1) by Does Not Apply route daily as needed.  famotidine (Pepcid AC) 20 mg tablet Take 20 mg by mouth daily as needed for Heartburn. Indications: heartburn    acetaminophen (TylenoL) 325 mg tablet Take 325 mg by mouth every four (4) hours as needed for Pain.  aspirin delayed-release 81 mg tablet Take 81 mg by mouth daily as needed for Pain.      REVIEW OF SYSTEMS:     []     Unable to obtain  ROS due to  []    mental status change  []    sedated   []    intubated  Review of Systems -   History obtained from the patient  General ROS: positive for  - chills and fever  Psychological ROS: negative for - anxiety or depression  Hematological and Lymphatic ROS: negative for - blood clots  Respiratory ROS: positive for - shortness of breath  Cardiovascular ROS: no chest pain or dyspnea on exertion  Gastrointestinal ROS: See HPI  Genito-Urinary ROS: no dysuria, trouble voiding, or hematuria  Musculoskeletal ROS: positive for - joint pain  Neurological ROS: no TIA or stroke symptoms  Dermatological ROS: positive for - Psoriasis    Objective:   VITALS:    Visit Vitals  /88   Pulse (!) 123   Temp (!) 100.5 °F (38.1 °C)   Resp 25   SpO2 96%     Temp (24hrs), Av.6 °F (37.6 °C), Min:98.6 °F (37 °C), Max:100.5 °F (38.1 °C)    PHYSICAL EXAM:   General:    Alert, cooperative, no distress, appears stated age. Head:   Normocephalic, without obvious abnormality, atraumatic. Eyes:   Conjunctivae clear, anicteric sclerae. Pupils are equal  Abdomen:   Obese, Soft, non-tender. Not distended. Bowel sounds normal. No masses. No hepatosplenomegaly. No shifting dullness. Rectal:  Deferred, heme positive stool  Extremities: No cyanosis. No edema. No clubbing  Skin:     Texture, turgor normal. No rashes/lesions/jaundice  Lymph: Cervical, supraclavicular normal.  Psych:  Good insight. Not depressed. Not anxious or agitated. Neurologic: EOMs intact. No facial asymmetry. No aphasia or slurred speech normal strength, A/O X 3. LAB DATA REVIEWED:    Lab Results   Component Value Date/Time    WBC 7.8 04/13/2020 01:56 PM    HGB 11.9 04/13/2020 01:56 PM    HCT 34.8 (L) 04/13/2020 01:56 PM    PLATELET 474 (L) 81/39/9944 01:56 PM    .8 (H) 04/13/2020 01:56 PM     Lab Results   Component Value Date/Time    ALT (SGPT) 68 04/13/2020 01:56 PM    AST (SGOT) 122 (H) 04/13/2020 01:56 PM    Alk.  phosphatase 148 (H) 04/13/2020 01:56 PM    Bilirubin, total 1.4 (H) 04/13/2020 01:56 PM     Lab Results   Component Value Date/Time    Sodium 138 04/13/2020 01:56 PM    Potassium 3.1 (L) 04/13/2020 01:56 PM    Chloride 104 04/13/2020 01:56 PM    CO2 28 04/13/2020 01:56 PM    Anion gap 6 04/13/2020 01:56 PM    Glucose 166 (H) 04/13/2020 01:56 PM    BUN 30 (H) 04/13/2020 01:56 PM    Creatinine 0.67 04/13/2020 01:56 PM    BUN/Creatinine ratio 45 (H) 04/13/2020 01:56 PM    GFR est AA >60 04/13/2020 01:56 PM    GFR est non-AA >60 04/13/2020 01:56 PM    Calcium 9.1 04/13/2020 01:56 PM     No results found for: LPSE  Lab Results   Component Value Date/Time    INR 1.4 (H) 04/13/2020 02:05 PM    INR 1.1 02/03/2014 10:35 AM    Prothrombin time 14.0 (H) 04/13/2020 02:05 PM    Prothrombin time 11.2 02/03/2014 10:35 AM       IMAGING RESULTS:  XR Results (most recent):  Results from Hospital Encounter encounter on 04/13/20   XR CHEST PORT    Narrative Portable chest single view dated 4/13/2020    Comparison chest dated 2/3/2014    History is hemoptysis and fever    A single frontal view of the chest was obtained. The cardiac silhouette is  normal in size. There is no evidence of active lung disease. Impression IMPRESSION: No evidence of active intrathoracic disease. Impression:  Active Problems:    Elevated LFTs (4/13/2020)      Hematemesis (4/13/2020)      Nausea vomiting and diarrhea (4/13/2020)      Alcohol abuse (4/13/2020)         Plan:  Patient is presenting with N/V/D, reports 3 episodes of hematemesis starting at midnight today. Hgb is stable, was febrile on presentation and diaphoretic. Unknown cause of fever. Given diarrhea we will check C. Diff as well as stool culture. Elevaed LFTs likely seocndary to increased EtOH use however we will check an abdominal US. Consider r.o of COVID-19 given N/V/D can be symptoms of virus. We will plan for EGD after etiology of fever is determined unless she has an acute unstable bleed then would preform sooner. Start on IV PPI as well as Zofran ORN for nausea.        ___________________________________________________  Care Plan discussed with:    [x]    Patient   []    Family   []    Nursing   []    Attending  Total Time :  30   minutes   ___________________________________________________  GI: MARIIA Mclaughlin       The patient was seen by me. I have discussed the case with the mid-level provider in detail. I have reviewed the patient's chart and the note. I personally performed all components of the history, physical, and medical decision making and agree with the assessment and plan, with minor modifications as noted. Please see APPs note for full details.      Physical exam:  General:AAO x 3,waringa mask,   HEENT:  EOMI,   GI: see above    Data Review: reviewed     Impression:   N/V/blood in emesis  diarrhea   Fever  Alcoholism,  Elevated LFTs    Plan and discussion:  Her hgb is 11.9 currently and she ate. With fever and generalized malaise I agree with making sure that she is r/o for infectious causes including COVID 19. In the interim we suggest CIWA protocol,  US RUQ,  PPI/Anti emetics  Avoid retching. Follow LFTs  EGD after above is ruled out( or urgently for destabilizing bleeding). Thank you for this cionsult             Signed By: Anand Menezes MD    4/13/2020  4:46 PM

## 2020-04-13 NOTE — ED NOTES
TRANSFER - OUT REPORT:    Verbal report given to [address] [place] [address] on Iesha Rodriguez  being transferred to [facility] for routine progression of care       Report consisted of patients Situation, Background, Assessment and   Recommendations(SBAR). Information from the following report(s) SBAR and ED Summary was reviewed with the receiving nurse. Lines:   Peripheral IV 04/13/20 Left Forearm (Active)   Site Assessment Clean, dry, & intact 4/13/2020  1:53 PM   Phlebitis Assessment 0 4/13/2020  1:53 PM   Infiltration Assessment 0 4/13/2020  1:53 PM   Dressing Status Clean, dry, & intact 4/13/2020  1:53 PM       Peripheral IV 04/13/20 Left Hand (Active)   Site Assessment Clean, dry, & intact 4/13/2020  5:51 PM   Phlebitis Assessment 0 4/13/2020  5:51 PM   Infiltration Assessment 0 4/13/2020  5:51 PM   Dressing Status Clean, dry, & intact 4/13/2020  5:51 PM        Opportunity for questions and clarification was provided.       Patient transported with:   Monitor

## 2020-04-13 NOTE — H&P
Hospitalist Admission Note    NAME: Teo Washington   :  1955   MRN:  [id number]     Date/Time:  2020 5:27 PM    Patient PCP: Camryn Rubin, NP  ________________________________________________________________________    My assessment of this patient's clinical condition and my plan of care is as follows. Assessment / Plan:  Nausea, vomiting, hematemesis  Diarrhea rule out infectious etiology including Covid 19  -Patient presenting with nausea, vomiting, hematemesis and also dark stools  -Due to propensity of coronavirus presenting with GI symptoms, will rule out Covid  -Start IV fluids with normal saline. Insert 2 large-bore IV lines. Start Protonix 40 mg IV twice daily. GI has been consulted. Clear liquid diet for now. Keep n.p.o. from midnight.  -Check for stool studies including C. Difficile.  -Ultrasound of abdomen was within normal limits. Will check CT abdomen due to fever.  -Initiate protocol for coronavirus including droplet, droplet plus and also contact precautions. Order inflammatory markers. Holding off on starting chloroquine for now. -Timing of endoscopy will be dependent upon results of coronavirus test    Severe sepsis  Possible UTI  Rule out colitis  -She is currently febrile and also tachycardic and lactic acid is also elevated  -Continue IV fluids. Start empiric ceftriaxone and Flagyl left. Urine and blood cultures have been sent. -Recheck lactic acid in 6 hours  --Blood cultures have been sent.  -Follow results of CT abdomen    Hypokalemia  -Replaced with KCl. Acute transaminitis   Hyperbilirubinemia  -Could be related to alcohol versus coronavirus  -Ultrasound shows normal gallbladder  -Recheck CMP in a.m.  -Total bilirubin is elevated at 1.4. INR is 1.4. Recheck in a.m. Chronic alcoholism with concern for alcohol withdrawal  -Start alcohol withdrawal protocol with Ativan.   Start banana bag.  -Telemetry monitoring            Code Status: full Surrogate Decision Maker: Spouse Thomas    DVT Prophylaxis: Scd's      Baseline: From home independent        Subjective:   CHIEF COMPLAINT: Nausea, vomiting, hematemesis    HISTORY OF PRESENT ILLNESS:     Edu Baker is a 62 y.o.   female who presents with no past medical history of osteoporosis, dyslipidemia is coming to the hospital chief complaints of nausea, vomiting, hematemesis and diarrhea since last 3 to 4 days. Patient reports being in her usual state of health until about 4 days ago when she started not feeling well. She reports that she is having nausea along with vomiting and is not able to keep anything down. She also reports no blood in her vomit. She also reports diarrhea with dark stools as well. She also reports cough with small amount of whitish phlegm. She does not report any shortness of breath. She also reports fever with chills at home. She does not report any chest pain. She does not report any exposure to sick contacts. Denies exposure to anyone with positive coronavirus infection. On arrival to the hospital, she was noted to have fever of 100.5, was tachycardic and blood pressure was also elevated at 166/98. On labs she was noted to have a hemoglobin of 11.9. BMP showed a potassium of 3.1, creatinine was normal.  ALT and AST were elevated at 68 and 122. Lactic acid was elevated at 2.3. Total bilirubin was 1.4. Chest x-ray shows no acute process. We were asked to admit for work up and evaluation of the above problems.      Past Medical History:   Diagnosis Date    Arthritis     osteoporosis    Other ill-defined conditions(149.89)     high cholesterol    Psychiatric disorder     anxious        Past Surgical History:   Procedure Laterality Date    BREAST SURGERY PROCEDURE UNLISTED      HX BREAST BIOPSY Right     at age 25   [facility]      hysterectomy    HX HYSTERECTOMY      HX OOPHORECTOMY         Social History     Tobacco Use    Smoking status: Never Smoker    Smokeless tobacco: Never Used   Substance Use Topics    Alcohol use: Yes     Comment: social        Family History   Problem Relation Age of Onset    Stroke Mother    Rasheedlinh Carcamo Mother     Glaucoma Mother     Colon Cancer Father         64    Hypertension Brother     Breast Cancer Paternal Aunt      Allergies   Allergen Reactions    Amoxicillin Hives        Prior to Admission medications    Medication Sig Start Date End Date Taking? Authorizing Provider   MULTIVITAMIN PO Take  by mouth daily as needed. Yes Jamee, MD Ramiro   ascorbic acid, vitamin C, (Vitamin C) 500 mg tablet Take 1,000 mg by mouth daily. Yes Jamee, MD Ramiro   cholecalciferol, vitamin D3, (Vitamin D3) 50 mcg (2,000 unit) tab Take  by mouth daily as needed. Yes Jamee, MD Ramiro   phytonadione, vit K1, (VITAMIN K1) by Does Not Apply route daily as needed. Yes Jamee, MD Ramiro   famotidine (Pepcid AC) 20 mg tablet Take 20 mg by mouth daily as needed for Heartburn. Indications: heartburn   Yes Jamee, MD Ramiro   acetaminophen (TylenoL) 325 mg tablet Take 325 mg by mouth every four (4) hours as needed for Pain. Yes Jamee, MD Ramiro   aspirin delayed-release 81 mg tablet Take 81 mg by mouth daily as needed for Pain. Yes Jamee, MD Ramiro       REVIEW OF SYSTEMS:     I am not able to complete the review of systems because:    The patient is intubated and sedated    The patient has altered mental status due to his acute medical problems    The patient has baseline aphasia from prior stroke(s)    The patient has baseline dementia and is not reliable historian    The patient is in acute medical distress and unable to provide information           Total of 12 systems reviewed as follows:       POSITIVE= underlined text  Negative = text not underlined  General:  fever, chills, sweats, generalized weakness, weight loss/gain,      loss of appetite   Eyes:    blurred vision, eye pain, loss of vision, double vision  ENT:    rhinorrhea, pharyngitis Respiratory:   cough, sputum production, SOB, CALLES, wheezing, pleuritic pain   Cardiology:   chest pain, palpitations, orthopnea, PND, edema, syncope   Gastrointestinal:  abdominal pain , N/V, diarrhea, dysphagia, constipation, bleeding   Genitourinary:  frequency, urgency, dysuria, hematuria, incontinence   Muskuloskeletal :  arthralgia, myalgia, back pain  Hematology:  easy bruising, nose or gum bleeding, lymphadenopathy   Dermatological: rash, ulceration, pruritis, color change / jaundice  Endocrine:   hot flashes or polydipsia   Neurological:  headache, dizziness, confusion, focal weakness, paresthesia,     Speech difficulties, memory loss, gait difficulty  Psychological: Feelings of anxiety, depression, agitation    Objective:   VITALS:    Visit Vitals  /87   Pulse (!) 135   Temp (!) 100.5 °F (38.1 °C)   Resp 12   SpO2 98%       PHYSICAL EXAM:    General:    Alert, cooperative, no distress, appears stated age. HEENT: Atraumatic, anicteric sclerae, pink conjunctivae  Neck:  symmetrical,    Lungs:   Breathing pattern is regular, no accessory muscle use  Chest wall:  No Accessory muscle use. Heart:   Tachycardic, regular  Abdomen:   Not distended  Extremities: No cyanosis. No clubbing,    Skin:     Not pale. Not Jaundiced  No rashes   Psych:  Not anxious or agitated.   Neurologic: Alert, awake, moving all 4 extremities    _______________________________________________________________________  Care Plan discussed with:    Comments   Patient y    Family      RN y    Care Manager                    Consultant:      _______________________________________________________________________  Expected  Disposition:   Home with Family y   HH/PT/OT/RN    SNF/LTC    HEATHER    ________________________________________________________________________  TOTAL TIME:  61  Minutes    Critical Care Provided     Minutes non procedure based      Comments    y Reviewed previous records   >50% of visit spent in counseling and coordination of care y Discussion with patient and/or family and questions answered       ________________________________________________________________________  Signed: Emely Bullard MD    Procedures: see electronic medical records for all procedures/Xrays and details which were not copied into this note but were reviewed prior to creation of Plan. LAB DATA REVIEWED:    Recent Results (from the past 24 hour(s))   CBC WITH AUTOMATED DIFF    Collection Time: 04/13/20  1:56 PM   Result Value Ref Range    WBC 7.8 3.6 - 11.0 K/uL    RBC 3.42 (L) 3.80 - 5.20 M/uL    HGB 11.9 11.5 - 16.0 g/dL    HCT 34.8 (L) 35.0 - 47.0 %    .8 (H) 80.0 - 99.0 FL    MCH 34.8 (H) 26.0 - 34.0 PG    MCHC 34.2 30.0 - 36.5 g/dL    RDW 12.6 11.5 - 14.5 %    PLATELET 546 (L) 997 - 400 K/uL    MPV 11.0 8.9 - 12.9 FL    NRBC 0.0 0  WBC    ABSOLUTE NRBC 0.00 0.00 - 0.01 K/uL    NEUTROPHILS 77 (H) 32 - 75 %    LYMPHOCYTES 11 (L) 12 - 49 %    MONOCYTES 12 5 - 13 %    EOSINOPHILS 0 0 - 7 %    BASOPHILS 0 0 - 1 %    IMMATURE GRANULOCYTES 0 0.0 - 0.5 %    ABS. NEUTROPHILS 5.9 1.8 - 8.0 K/UL    ABS. LYMPHOCYTES 0.9 0.8 - 3.5 K/UL    ABS. MONOCYTES 0.9 0.0 - 1.0 K/UL    ABS. EOSINOPHILS 0.0 0.0 - 0.4 K/UL    ABS. BASOPHILS 0.0 0.0 - 0.1 K/UL    ABS. IMM. GRANS. 0.0 0.00 - 0.04 K/UL    DF AUTOMATED     METABOLIC PANEL, COMPREHENSIVE    Collection Time: 04/13/20  1:56 PM   Result Value Ref Range    Sodium 138 136 - 145 mmol/L    Potassium 3.1 (L) 3.5 - 5.1 mmol/L    Chloride 104 97 - 108 mmol/L    CO2 28 21 - 32 mmol/L    Anion gap 6 5 - 15 mmol/L    Glucose 166 (H) 65 - 100 mg/dL    BUN 30 (H) 6 - 20 MG/DL    Creatinine 0.67 0.55 - 1.02 MG/DL    BUN/Creatinine ratio 45 (H) 12 - 20      GFR est AA >60 >60 ml/min/1.73m2    GFR est non-AA >60 >60 ml/min/1.73m2    Calcium 9.1 8.5 - 10.1 MG/DL    Bilirubin, total 1.4 (H) 0.2 - 1.0 MG/DL    ALT (SGPT) 68 12 - 78 U/L    AST (SGOT) 122 (H) 15 - 37 U/L    Alk.  phosphatase 148 (H) 45 - 117 U/L Protein, total 7.8 6.4 - 8.2 g/dL    Albumin 3.4 (L) 3.5 - 5.0 g/dL    Globulin 4.4 (H) 2.0 - 4.0 g/dL    A-G Ratio 0.8 (L) 1.1 - 2.2     TYPE & SCREEN    Collection Time: 04/13/20  1:56 PM   Result Value Ref Range    Crossmatch Expiration 04/16/2020     ABO/Rh(D) A POSITIVE     Antibody screen NEG    PROTHROMBIN TIME + INR    Collection Time: 04/13/20  2:05 PM   Result Value Ref Range    INR 1.4 (H) 0.9 - 1.1      Prothrombin time 14.0 (H) 9.0 - 11.1 sec   URINALYSIS W/ RFLX MICROSCOPIC    Collection Time: 04/13/20  2:05 PM   Result Value Ref Range    Color DARK YELLOW      Appearance CLEAR CLEAR      Specific gravity 1.026 1.003 - 1.030      pH (UA) 7.0 5.0 - 8.0      Protein TRACE (A) NEG mg/dL    Glucose Negative NEG mg/dL    Ketone TRACE (A) NEG mg/dL    Blood TRACE (A) NEG      Urobilinogen 1.0 0.2 - 1.0 EU/dL    Nitrites Negative NEG      Leukocyte Esterase MODERATE (A) NEG      WBC 5-10 0 - 4 /hpf    RBC 5-10 0 - 5 /hpf    Epithelial cells FEW FEW /lpf    Bacteria Negative NEG /hpf    Hyaline cast 2-5 0 - 5 /lpf   BILIRUBIN, CONFIRM    Collection Time: 04/13/20  2:05 PM   Result Value Ref Range    Bilirubin UA, confirm Positive (A) NEG     LACTIC ACID    Collection Time: 04/13/20  2:17 PM   Result Value Ref Range    Lactic acid 2.3 (HH) 0.4 - 2.0 MMOL/L   OCCULT BLOOD, STOOL    Collection Time: 04/13/20  2:18 PM   Result Value Ref Range    Occult blood, stool Positive (A) NEG

## 2020-04-14 LAB
ALBUMIN SERPL-MCNC: 3 G/DL (ref 3.5–5)
ALBUMIN/GLOB SERPL: 0.8 {RATIO} (ref 1.1–2.2)
ALP SERPL-CCNC: 124 U/L (ref 45–117)
ALT SERPL-CCNC: 51 U/L (ref 12–78)
ANION GAP SERPL CALC-SCNC: 8 MMOL/L (ref 5–15)
AST SERPL-CCNC: 82 U/L (ref 15–37)
BASOPHILS # BLD: 0 K/UL (ref 0–0.1)
BASOPHILS NFR BLD: 1 % (ref 0–1)
BILIRUB SERPL-MCNC: 1 MG/DL (ref 0.2–1)
BUN SERPL-MCNC: 20 MG/DL (ref 6–20)
BUN/CREAT SERPL: 41 (ref 12–20)
C DIFF GDH STL QL: NEGATIVE
C DIFF TOX A+B STL QL IA: NEGATIVE
CALCIUM SERPL-MCNC: 8.1 MG/DL (ref 8.5–10.1)
CAMPYLOBACTER SPECIES, DNA: NEGATIVE
CHLORIDE SERPL-SCNC: 109 MMOL/L (ref 97–108)
CO2 SERPL-SCNC: 24 MMOL/L (ref 21–32)
COMMENT, HOLDF: NORMAL
CREAT SERPL-MCNC: 0.49 MG/DL (ref 0.55–1.02)
DIFFERENTIAL METHOD BLD: ABNORMAL
ENTEROTOXIGEN E COLI, DNA: NEGATIVE
EOSINOPHIL # BLD: 0 K/UL (ref 0–0.4)
EOSINOPHIL NFR BLD: 1 % (ref 0–7)
ERYTHROCYTE [DISTWIDTH] IN BLOOD BY AUTOMATED COUNT: 12.9 % (ref 11.5–14.5)
GLOBULIN SER CALC-MCNC: 3.9 G/DL (ref 2–4)
GLUCOSE SERPL-MCNC: 108 MG/DL (ref 65–100)
HCT VFR BLD AUTO: 31.5 % (ref 35–47)
HEMOCCULT STL QL: POSITIVE
HGB BLD-MCNC: 10.4 G/DL (ref 11.5–16)
HGB BLD-MCNC: 9.5 G/DL (ref 11.5–16)
IMM GRANULOCYTES # BLD AUTO: 0 K/UL (ref 0–0.04)
IMM GRANULOCYTES NFR BLD AUTO: 0 % (ref 0–0.5)
INTERPRETATION: NORMAL
LYMPHOCYTES # BLD: 1.3 K/UL (ref 0.8–3.5)
LYMPHOCYTES NFR BLD: 21 % (ref 12–49)
MAGNESIUM SERPL-MCNC: 1.4 MG/DL (ref 1.6–2.4)
MCH RBC QN AUTO: 34.3 PG (ref 26–34)
MCHC RBC AUTO-ENTMCNC: 33 G/DL (ref 30–36.5)
MCV RBC AUTO: 104 FL (ref 80–99)
MONOCYTES # BLD: 0.6 K/UL (ref 0–1)
MONOCYTES NFR BLD: 10 % (ref 5–13)
NEUTS SEG # BLD: 4.1 K/UL (ref 1.8–8)
NEUTS SEG NFR BLD: 67 % (ref 32–75)
NRBC # BLD: 0 K/UL (ref 0–0.01)
NRBC BLD-RTO: 0 PER 100 WBC
P SHIGELLOIDES DNA STL QL NAA+PROBE: NEGATIVE
PLATELET # BLD AUTO: 114 K/UL (ref 150–400)
PMV BLD AUTO: 10.9 FL (ref 8.9–12.9)
POTASSIUM SERPL-SCNC: 3.2 MMOL/L (ref 3.5–5.1)
PROT SERPL-MCNC: 6.9 G/DL (ref 6.4–8.2)
RBC # BLD AUTO: 3.03 M/UL (ref 3.8–5.2)
SALMONELLA SPECIES, DNA: NEGATIVE
SAMPLES BEING HELD,HOLD: NORMAL
SARS-COV-2, COV2: NOT DETECTED
SHIGA TOXIN PRODUCING, DNA: NEGATIVE
SHIGELLA SP+EIEC IPAH STL QL NAA+PROBE: NEGATIVE
SODIUM SERPL-SCNC: 141 MMOL/L (ref 136–145)
SPECIMEN SOURCE, FCOV2M: NORMAL
VIBRIO SPECIES, DNA: NEGATIVE
WBC # BLD AUTO: 6.1 K/UL (ref 3.6–11)
WBC #/AREA STL HPF: NORMAL /HPF (ref 0–4)
Y. ENTEROCOLITICA, DNA: NEGATIVE

## 2020-04-14 PROCEDURE — 85018 HEMOGLOBIN: CPT

## 2020-04-14 PROCEDURE — 94760 N-INVAS EAR/PLS OXIMETRY 1: CPT

## 2020-04-14 PROCEDURE — C9113 INJ PANTOPRAZOLE SODIUM, VIA: HCPCS | Performed by: INTERNAL MEDICINE

## 2020-04-14 PROCEDURE — 65660000001 HC RM ICU INTERMED STEPDOWN

## 2020-04-14 PROCEDURE — 74011000250 HC RX REV CODE- 250: Performed by: INTERNAL MEDICINE

## 2020-04-14 PROCEDURE — 85025 COMPLETE CBC W/AUTO DIFF WBC: CPT

## 2020-04-14 PROCEDURE — 82272 OCCULT BLD FECES 1-3 TESTS: CPT

## 2020-04-14 PROCEDURE — 80053 COMPREHEN METABOLIC PANEL: CPT

## 2020-04-14 PROCEDURE — 83735 ASSAY OF MAGNESIUM: CPT

## 2020-04-14 PROCEDURE — 36415 COLL VENOUS BLD VENIPUNCTURE: CPT

## 2020-04-14 PROCEDURE — 74011000258 HC RX REV CODE- 258: Performed by: INTERNAL MEDICINE

## 2020-04-14 PROCEDURE — 74011250636 HC RX REV CODE- 250/636: Performed by: INTERNAL MEDICINE

## 2020-04-14 RX ORDER — SODIUM CHLORIDE AND POTASSIUM CHLORIDE .9; .15 G/100ML; G/100ML
SOLUTION INTRAVENOUS CONTINUOUS
Status: DISCONTINUED | OUTPATIENT
Start: 2020-04-14 | End: 2020-04-16

## 2020-04-14 RX ADMIN — CEFTRIAXONE 1 G: 1 INJECTION, POWDER, FOR SOLUTION INTRAMUSCULAR; INTRAVENOUS at 00:24

## 2020-04-14 RX ADMIN — FOLIC ACID: 5 INJECTION, SOLUTION INTRAMUSCULAR; INTRAVENOUS; SUBCUTANEOUS at 13:20

## 2020-04-14 RX ADMIN — METRONIDAZOLE 500 MG: 500 INJECTION, SOLUTION INTRAVENOUS at 11:48

## 2020-04-14 RX ADMIN — METRONIDAZOLE 500 MG: 500 INJECTION, SOLUTION INTRAVENOUS at 02:42

## 2020-04-14 RX ADMIN — Medication 10 ML: at 06:16

## 2020-04-14 RX ADMIN — METRONIDAZOLE 500 MG: 500 INJECTION, SOLUTION INTRAVENOUS at 18:32

## 2020-04-14 RX ADMIN — Medication 10 ML: at 13:21

## 2020-04-14 RX ADMIN — Medication 10 ML: at 21:38

## 2020-04-14 RX ADMIN — CEFTRIAXONE 1 G: 1 INJECTION, POWDER, FOR SOLUTION INTRAMUSCULAR; INTRAVENOUS at 23:37

## 2020-04-14 RX ADMIN — SODIUM CHLORIDE 40 MG: 9 INJECTION INTRAMUSCULAR; INTRAVENOUS; SUBCUTANEOUS at 08:14

## 2020-04-14 RX ADMIN — SODIUM CHLORIDE 100 ML/HR: 900 INJECTION, SOLUTION INTRAVENOUS at 06:16

## 2020-04-14 RX ADMIN — SODIUM CHLORIDE 40 MG: 9 INJECTION INTRAMUSCULAR; INTRAVENOUS; SUBCUTANEOUS at 21:37

## 2020-04-14 NOTE — PROGRESS NOTES
Problem: Risk for Spread of Infection  Goal: Prevent transmission of infectious organism to others  Description: Prevent the transmission of infectious organisms to other patients, staff members, and visitors. Outcome: Progressing Towards Goal     Problem: Falls - Risk of  Goal: *Absence of Falls  Description: Document Margie Ludwig Fall Risk and appropriate interventions in the flowsheet. Outcome: Progressing Towards Goal  Note: Fall Risk Interventions:                                Problem: Pressure Injury - Risk of  Goal: *Prevention of pressure injury  Description: Document Jose G Scale and appropriate interventions in the flowsheet.   Outcome: Progressing Towards Goal  Note: Pressure Injury Interventions:       Moisture Interventions: Absorbent underpads, Apply protective barrier, creams and emollients, Check for incontinence Q2 hours and as needed, Offer toileting Q_hr, Minimize layers, Maintain skin hydration (lotion/cream)    Activity Interventions: Increase time out of bed, Chair cushion, Pressure redistribution bed/mattress(bed type), PT/OT evaluation    Mobility Interventions: Chair cushion, Float heels, HOB 30 degrees or less    Nutrition Interventions: Document food/fluid/supplement intake, Discuss nutritional consult with provider    Friction and Shear Interventions: HOB 30 degrees or less, Lift sheet, Minimize layers

## 2020-04-14 NOTE — PROGRESS NOTES
Hospitalist Progress Note    NAME: Humberto Mean   :  1955   MRN:  [id number]     Interim Hospital Summary: 62 y.o. female whom presented on 2020 with      Assessment / Plan:    Severe sepsis  Possible UTI  Nausea, vomiting, hematemesis  Diarrhea with ileitis/colitis on CT  -CT abdomen  IMPRESSION:   1. There is a short segment of concentric mural thickening in the terminal ileum  which may represent an infectious/inflammatory process. 2. Short segment of concentric mural thickening in the sigmoid colon/rectum  which could represent colitis/proctitis. 3. Decompressed gallbladder with questionable gallbladder wall thickening and  possible hyperemia. 4. Hepatic steatosis with heterogeneous areas of what appears to be sparing.    -Patient presenting with nausea, vomiting, hematemesis and also dark stools  -agree to rule out COVID 19- test pending  -Check for stool studies including C. Difficile. -GI input noted  -continue empiric abx     Hypokalemia  -add kcl to IVFs     Acute transaminitis   Hyperbilirubinemia  -Could be related to alcohol versus coronavirus vs sepsis  -Ultrasound shows normal gallbladder  -follow     Chronic alcoholism with concern for alcohol withdrawal  -monitor closely. CIWA       Code Status: full   Surrogate Decision Maker: Spouse Thomas     DVT Prophylaxis: Scd's        Subjective:     Chief Complaint / Reason for Physician Visit  Follow up of NV D, LFT elevation ETOH  Chart reviewed in detail. Discussed with RN events overnight. Review of Systems:  Symptom Y/N Comments  Symptom Y/N Comments   Fever/Chills    Chest Pain     Poor Appetite    Edema     Cough    Abdominal Pain     Sputum    Joint Pain     SOB/CALLES    Pruritis/Rash     Nausea/vomit y   Tolerating PT/OT     Diarrhea y   Tolerating Diet     Constipation    Other       Could NOT obtain due to:      PO intake: No data found.   Objective:     VITALS:   Last 24hrs VS reviewed since prior progress note. Most recent are:  Patient Vitals for the past 24 hrs:   Temp Pulse Resp BP SpO2   04/14/20 1148 98.3 °F (36.8 °C) -- 18 129/70 --   04/14/20 0808 98.2 °F (36.8 °C) 86 18 130/86 99 %   04/14/20 0245 98.8 °F (37.1 °C) 99 20 134/81 98 %   04/13/20 2245 99 °F (37.2 °C) 98 20 128/74 98 %   04/13/20 1936 99.5 °F (37.5 °C) (!) 118 20 142/83 97 %   04/13/20 1855 100.4 °F (38 °C) (!) 119 21 149/84 97 %   04/13/20 1800 -- (!) 127 19 144/87 96 %   04/13/20 1730 -- (!) 132 18 142/82 97 %   04/13/20 1600 -- (!) 135 12 156/87 98 %   04/13/20 1530 -- (!) 126 17 (!) 166/91 96 %   04/13/20 1430 -- (!) 123 25 146/88 96 %       Intake/Output Summary (Last 24 hours) at 4/14/2020 1417  Last data filed at 4/14/2020 0419  Gross per 24 hour   Intake 120 ml   Output 1000 ml   Net -880 ml        PHYSICAL EXAM:  General: WD, WN. Alert, cooperative, no acute distress    EENT:  EOMI. Anicteric sclerae. MMM  Resp:  CTA bilaterally, no wheezing or rales. No accessory muscle use  CV:  Regular  rhythm,  No edema  GI:  Soft, Non distended, Non tender.  +Bowel sounds  Neurologic:  Alert and oriented X 3, normal speech,   Psych:   Good insight. Not anxious nor agitated  Skin:  No rashes. No jaundice    Reviewed most current lab test results and cultures  YES  Reviewed most current radiology test results   YES  Review and summation of old records today    NO  Reviewed patient's current orders and MAR    YES  PMH/SH reviewed - no change compared to H&P  ________________________________________________________________________  Care Plan discussed with:    Comments   Patient x    Family      RN x    Care Manager     Consultant                        Multidiciplinary team rounds were held today with , nursing, pharmacist and clinical coordinator. Patient's plan of care was discussed; medications were reviewed and discharge planning was addressed. ________________________________________________________________________  Total NON critical care TIME:  25   Minutes    Total CRITICAL CARE TIME Spent:   Minutes non procedure based      Comments   >50% of visit spent in counseling and coordination of care x     This includes time during multidisciplinary rounds if indicated above   ________________________________________________________________________  Yumi Cordova MD     Procedures: see electronic medical records for all procedures/Xrays and details which were not copied into this note but were reviewed prior to creation of Plan. LABS:  I reviewed today's most current labs and imaging studies.   Pertinent labs include:  Recent Labs     04/14/20  0825 04/14/20  0035 04/13/20  1356   WBC  --  6.1 7.8   HGB 9.5* 10.4* 11.9   HCT  --  31.5* 34.8*   PLT  --  114* 135*     Recent Labs     04/14/20  0035 04/13/20  1405 04/13/20  1356     --  138   K 3.2*  --  3.1*   *  --  104   CO2 24  --  28   *  --  166*   BUN 20  --  30*   CREA 0.49*  --  0.67   CA 8.1*  --  9.1   MG 1.4*  --   --    ALB 3.0*  --  3.4*   TBILI 1.0  --  1.4*   SGOT 82*  --  122*   ALT 51  --  68   INR  --  1.4*  --

## 2020-04-14 NOTE — PROGRESS NOTES
[id number]- member Id number for [facility]     [phone] number to call    Bedside and Verbal shift change report given to Nadine Flores (oncoming nurse) by [facility] (offgoing nurse). Report included the following information SBAR, Kardex, MAR, Accordion, Recent Results and Cardiac Rhythm low sinus tachy into the 110s with movement.

## 2020-04-14 NOTE — PROGRESS NOTES
Gastroenterology Progress Note    4/14/2020    Admit Date: 4/13/2020    Subjective: Follow up for: N/V/Blood per os/diarrhea      Telemedicine rounding was done: As per RNs notes :    \"Patient did not have any hematemesis or N/V during shift. Patient had several watery loose stools through out shift. Stool studies collected and sent to lab. \"    Hgb is lower today. Had fevers overnight. Current Facility-Administered Medications   Medication Dose Route Frequency    pantoprazole (PROTONIX) 40 mg in 0.9% sodium chloride 10 mL injection  40 mg IntraVENous Q12H    ondansetron (ZOFRAN) injection 4 mg  4 mg IntraVENous Q6H PRN    sodium chloride (NS) flush 5-40 mL  5-40 mL IntraVENous Q8H    sodium chloride (NS) flush 5-40 mL  5-40 mL IntraVENous PRN    0.9% sodium chloride infusion  100 mL/hr IntraVENous CONTINUOUS    acetaminophen (TYLENOL) tablet 650 mg  650 mg Oral Q6H PRN    Or    acetaminophen (TYLENOL) suppository 650 mg  650 mg Rectal Q6H PRN    0.9% sodium chloride 8,605 mL with folic acid 1 mg, thiamine 100 mg, mvi, adult no. 4 with vit K 10 mL infusion   IntraVENous DAILY    LORazepam (ATIVAN) injection 2 mg  2 mg IntraVENous Q1H PRN    LORazepam (ATIVAN) injection 4 mg  4 mg IntraVENous Q1H PRN    cefTRIAXone (ROCEPHIN) 1 g in 0.9% sodium chloride (MBP/ADV) 50 mL  1 g IntraVENous Q24H    metroNIDAZOLE (FLAGYL) IVPB premix 500 mg  500 mg IntraVENous Q8H        Objective:     Blood pressure 130/86, pulse 86, temperature 98.2 °F (36.8 °C), resp. rate 18, SpO2 99 %. No intake/output data recorded.     04/12 1901 - 04/14 0700  In: 120 [P.O.:120]  Out: 1000 [Urine:1000]        Physical Examination:       Not applicable    Data Review    Recent Results (from the past 24 hour(s))   CBC WITH AUTOMATED DIFF    Collection Time: 04/13/20  1:56 PM   Result Value Ref Range    WBC 7.8 3.6 - 11.0 K/uL    RBC 3.42 (L) 3.80 - 5.20 M/uL    HGB 11.9 11.5 - 16.0 g/dL    HCT 34.8 (L) 35.0 - 47.0 %    .8 (H) 80.0 - 99.0 FL    MCH 34.8 (H) 26.0 - 34.0 PG    MCHC 34.2 30.0 - 36.5 g/dL    RDW 12.6 11.5 - 14.5 %    PLATELET 643 (L) 848 - 400 K/uL    MPV 11.0 8.9 - 12.9 FL    NRBC 0.0 0  WBC    ABSOLUTE NRBC 0.00 0.00 - 0.01 K/uL    NEUTROPHILS 77 (H) 32 - 75 %    LYMPHOCYTES 11 (L) 12 - 49 %    MONOCYTES 12 5 - 13 %    EOSINOPHILS 0 0 - 7 %    BASOPHILS 0 0 - 1 %    IMMATURE GRANULOCYTES 0 0.0 - 0.5 %    ABS. NEUTROPHILS 5.9 1.8 - 8.0 K/UL    ABS. LYMPHOCYTES 0.9 0.8 - 3.5 K/UL    ABS. MONOCYTES 0.9 0.0 - 1.0 K/UL    ABS. EOSINOPHILS 0.0 0.0 - 0.4 K/UL    ABS. BASOPHILS 0.0 0.0 - 0.1 K/UL    ABS. IMM. GRANS. 0.0 0.00 - 0.04 K/UL    DF AUTOMATED     METABOLIC PANEL, COMPREHENSIVE    Collection Time: 04/13/20  1:56 PM   Result Value Ref Range    Sodium 138 136 - 145 mmol/L    Potassium 3.1 (L) 3.5 - 5.1 mmol/L    Chloride 104 97 - 108 mmol/L    CO2 28 21 - 32 mmol/L    Anion gap 6 5 - 15 mmol/L    Glucose 166 (H) 65 - 100 mg/dL    BUN 30 (H) 6 - 20 MG/DL    Creatinine 0.67 0.55 - 1.02 MG/DL    BUN/Creatinine ratio 45 (H) 12 - 20      GFR est AA >60 >60 ml/min/1.73m2    GFR est non-AA >60 >60 ml/min/1.73m2    Calcium 9.1 8.5 - 10.1 MG/DL    Bilirubin, total 1.4 (H) 0.2 - 1.0 MG/DL    ALT (SGPT) 68 12 - 78 U/L    AST (SGOT) 122 (H) 15 - 37 U/L    Alk.  phosphatase 148 (H) 45 - 117 U/L    Protein, total 7.8 6.4 - 8.2 g/dL    Albumin 3.4 (L) 3.5 - 5.0 g/dL    Globulin 4.4 (H) 2.0 - 4.0 g/dL    A-G Ratio 0.8 (L) 1.1 - 2.2     TYPE & SCREEN    Collection Time: 04/13/20  1:56 PM   Result Value Ref Range    Crossmatch Expiration 04/16/2020     ABO/Rh(D) A POSITIVE     Antibody screen NEG    PROTHROMBIN TIME + INR    Collection Time: 04/13/20  2:05 PM   Result Value Ref Range    INR 1.4 (H) 0.9 - 1.1      Prothrombin time 14.0 (H) 9.0 - 11.1 sec   URINALYSIS W/ RFLX MICROSCOPIC    Collection Time: 04/13/20  2:05 PM   Result Value Ref Range    Color DARK YELLOW      Appearance CLEAR CLEAR      Specific gravity 1.026 1.003 - 1.030      pH (UA) 7.0 5.0 - 8.0      Protein TRACE (A) NEG mg/dL    Glucose Negative NEG mg/dL    Ketone TRACE (A) NEG mg/dL    Blood TRACE (A) NEG      Urobilinogen 1.0 0.2 - 1.0 EU/dL    Nitrites Negative NEG      Leukocyte Esterase MODERATE (A) NEG      WBC 5-10 0 - 4 /hpf    RBC 5-10 0 - 5 /hpf    Epithelial cells FEW FEW /lpf    Bacteria Negative NEG /hpf    Hyaline cast 2-5 0 - 5 /lpf   BILIRUBIN, CONFIRM    Collection Time: 04/13/20  2:05 PM   Result Value Ref Range    Bilirubin UA, confirm Positive (A) NEG     CULTURE, BLOOD, PAIRED    Collection Time: 04/13/20  2:17 PM   Result Value Ref Range    Special Requests: NO SPECIAL REQUESTS      Culture result: NO GROWTH AFTER 16 HOURS     LACTIC ACID    Collection Time: 04/13/20  2:17 PM   Result Value Ref Range    Lactic acid 2.3 (HH) 0.4 - 2.0 MMOL/L   OCCULT BLOOD, STOOL    Collection Time: 04/13/20  2:18 PM   Result Value Ref Range    Occult blood, stool Positive (A) NEG     PROCALCITONIN    Collection Time: 04/13/20  5:47 PM   Result Value Ref Range    Procalcitonin 0.46 ng/mL   LD    Collection Time: 04/13/20  5:47 PM   Result Value Ref Range     81 - 246 U/L   C REACTIVE PROTEIN, QT    Collection Time: 04/13/20  5:47 PM   Result Value Ref Range    C-Reactive protein 1.51 (H) 0.00 - 0.60 mg/dL   FERRITIN    Collection Time: 04/13/20  5:47 PM   Result Value Ref Range    Ferritin 260 (H) 8 - 252 NG/ML   TROPONIN I    Collection Time: 04/13/20  5:47 PM   Result Value Ref Range    Troponin-I, Qt. <0.05 <0.05 ng/mL   SARS-COV-2    Collection Time: 04/13/20  5:48 PM   Result Value Ref Range    Specimen source Nasopharyngeal      SARS-CoV-2 PENDING    LACTIC ACID    Collection Time: 04/13/20 10:30 PM   Result Value Ref Range    Lactic acid 1.3 0.4 - 2.0 MMOL/L   CBC WITH AUTOMATED DIFF    Collection Time: 04/14/20 12:35 AM   Result Value Ref Range    WBC 6.1 3.6 - 11.0 K/uL    RBC 3.03 (L) 3.80 - 5.20 M/uL    HGB 10.4 (L) 11.5 - 16.0 g/dL    HCT 31.5 (L) 35.0 - 47.0 %    .0 (H) 80.0 - 99.0 FL    MCH 34.3 (H) 26.0 - 34.0 PG    MCHC 33.0 30.0 - 36.5 g/dL    RDW 12.9 11.5 - 14.5 %    PLATELET 331 (L) 132 - 400 K/uL    MPV 10.9 8.9 - 12.9 FL    NRBC 0.0 0  WBC    ABSOLUTE NRBC 0.00 0.00 - 0.01 K/uL    NEUTROPHILS 67 32 - 75 %    LYMPHOCYTES 21 12 - 49 %    MONOCYTES 10 5 - 13 %    EOSINOPHILS 1 0 - 7 %    BASOPHILS 1 0 - 1 %    IMMATURE GRANULOCYTES 0 0.0 - 0.5 %    ABS. NEUTROPHILS 4.1 1.8 - 8.0 K/UL    ABS. LYMPHOCYTES 1.3 0.8 - 3.5 K/UL    ABS. MONOCYTES 0.6 0.0 - 1.0 K/UL    ABS. EOSINOPHILS 0.0 0.0 - 0.4 K/UL    ABS. BASOPHILS 0.0 0.0 - 0.1 K/UL    ABS. IMM. GRANS. 0.0 0.00 - 0.04 K/UL    DF AUTOMATED     METABOLIC PANEL, COMPREHENSIVE    Collection Time: 04/14/20 12:35 AM   Result Value Ref Range    Sodium 141 136 - 145 mmol/L    Potassium 3.2 (L) 3.5 - 5.1 mmol/L    Chloride 109 (H) 97 - 108 mmol/L    CO2 24 21 - 32 mmol/L    Anion gap 8 5 - 15 mmol/L    Glucose 108 (H) 65 - 100 mg/dL    BUN 20 6 - 20 MG/DL    Creatinine 0.49 (L) 0.55 - 1.02 MG/DL    BUN/Creatinine ratio 41 (H) 12 - 20      GFR est AA >60 >60 ml/min/1.73m2    GFR est non-AA >60 >60 ml/min/1.73m2    Calcium 8.1 (L) 8.5 - 10.1 MG/DL    Bilirubin, total 1.0 0.2 - 1.0 MG/DL    ALT (SGPT) 51 12 - 78 U/L    AST (SGOT) 82 (H) 15 - 37 U/L    Alk. phosphatase 124 (H) 45 - 117 U/L    Protein, total 6.9 6.4 - 8.2 g/dL    Albumin 3.0 (L) 3.5 - 5.0 g/dL    Globulin 3.9 2.0 - 4.0 g/dL    A-G Ratio 0.8 (L) 1.1 - 2.2     MAGNESIUM    Collection Time: 04/14/20 12:35 AM   Result Value Ref Range    Magnesium 1.4 (L) 1.6 - 2.4 mg/dL   SAMPLES BEING HELD    Collection Time: 04/14/20 12:35 AM   Result Value Ref Range    SAMPLES BEING HELD LV     COMMENT        Add-on orders for these samples will be processed based on acceptable specimen integrity and analyte stability, which may vary by analyte.    OCCULT BLOOD, STOOL    Collection Time: 04/14/20  2:50 AM   Result Value Ref Range Occult blood, stool Positive (A) NEG     HEMOGLOBIN    Collection Time: 04/14/20  8:25 AM   Result Value Ref Range    HGB 9.5 (L) 11.5 - 16.0 g/dL     Recent Labs     04/14/20  0825 04/14/20  0035 04/13/20  1356   WBC  --  6.1 7.8   HGB 9.5* 10.4* 11.9   HCT  --  31.5* 34.8*   PLT  --  114* 135*     Recent Labs     04/14/20  0035 04/13/20  1356    138   K 3.2* 3.1*   * 104   CO2 24 28   BUN 20 30*   CREA 0.49* 0.67   * 166*   CA 8.1* 9.1   MG 1.4*  --      Recent Labs     04/14/20  0035 04/13/20  1356   SGOT 82* 122*   * 148*   TP 6.9 7.8   ALB 3.0* 3.4*   GLOB 3.9 4.4*     Recent Labs     04/13/20  1405   INR 1.4*   PTP 14.0*      Recent Labs     04/13/20  1747   FERR 260*      No results found for: FOL, RBCF   No results for input(s): PH, PCO2, PO2 in the last 72 hours. Recent Labs     04/13/20  1747   TROIQ <0.05     Lab Results   Component Value Date/Time    Cholesterol, total 289 (H) 12/04/2017 11:41 AM    HDL Cholesterol 65 12/04/2017 11:41 AM    LDL, calculated Comment 12/04/2017 11:41 AM    Triglyceride 992 (HH) 12/04/2017 11:41 AM     No components found for: [place]  Lab Results   Component Value Date/Time    Color DARK YELLOW 04/13/2020 02:05 PM    Appearance CLEAR 04/13/2020 02:05 PM    Specific gravity 1.026 04/13/2020 02:05 PM    pH (UA) 7.0 04/13/2020 02:05 PM    Protein TRACE (A) 04/13/2020 02:05 PM    Glucose Negative 04/13/2020 02:05 PM    Ketone TRACE (A) 04/13/2020 02:05 PM    Bilirubin NEGATIVE  02/03/2014 10:35 AM    Urobilinogen 1.0 04/13/2020 02:05 PM    Nitrites Negative 04/13/2020 02:05 PM    Leukocyte Esterase MODERATE (A) 04/13/2020 02:05 PM    Epithelial cells FEW 04/13/2020 02:05 PM    Bacteria Negative 04/13/2020 02:05 PM    WBC 5-10 04/13/2020 02:05 PM    RBC 5-10 04/13/2020 02:05 PM        ROS: -CP, SOB, Dysuria, palpitations, cough.     Assessment:    Active Problems:    Elevated LFTs (4/13/2020)      Hematemesis (4/13/2020)      Nausea vomiting and diarrhea (4/13/2020)      Alcohol abuse (4/13/2020)      Diarrhea       Plan/Discussion:     · CT showed  short segment of concentric mural thickening in the terminal ileum  which may represent an infectious/inflammatory process. Short segment of concentric mural thickening in the sigmoid colon/rectumwhich could represent colitis/proctitis. Decompressed gallbladder with questionable gallbladder wall thickening and possible hyperemia. Hepatic steatosis with heterogeneous areas of what appears to be sparing. No more hematemesis was noted. I suggest to treat this supportively  · We await stool analysis for common pathogens and c diff. Add IBD panel. · If diarrhea does not get better she may need a flex sig/colonoscopy +/- EGD after infectious causes and COVID 19 is ruled out. Timing will depend on her progress and above testing  · No more nausea/vomiting/hematemesis: suggestive of resolved/resolving UGI bleed (ddx MWT,esophagitis,gastritis,PUD and others)        Signed By: Laron Monge MD    4/14/2020  9:06 AM

## 2020-04-14 NOTE — PROGRESS NOTES
Transition of Care Plan:                      -Plan is home with family once medically stable. -Pt is open to the idea of Home Health at discharge or a Hospital to Home if needed. CM will need to continue to follow discharge plan. -CM will need to make appt prior to discharge.    -, Roque Bobby: will transport Pt home in car once stable. Pt was listed as self pay. Pt indicated that she has insurance through [employer]. She has her insurance information but does not have her insurance card. She will give information to RN and I will get from RN to make sure patient access has the correct information so that Pt does not have billing problems. Med Assist team to screen her as well. PHYSICAL ADDRESS: [address] [place] [address], [phone]    Reason for Admission:   Pt was admitted on 4/13/2020 d/t Dx of Nausea, vomiting, hematemsis, [place]. COVID 19 Rule Out. Sepsis. Possible UTI. RUR Score:          10           Plan for utilizing home health:      Open to the idea of using [facility] or [facility] if needed at discharge. PCP: First and Last name:  JOSELITO Barahona   Name of Practice:  [facility]   Are you a current patient: Yes/No:  YES   Approximate date of last visit: 2017                    Current Advanced Directive/Advance Care Plan: Full Code. No AMD on file. Pt was not interested in doing AMD on file. CM encouraged her to do one with PCP once stable. Care Management Interventions  PCP Verified by CM: Yes  Palliative Care Criteria Met (RRAT>21 & CHF Dx)?: No  Mode of Transport at Discharge:  Other (see comment)  Transition of Care Consult (CM Consult): Discharge Planning  MyChart Signup: No  Discharge Durable Medical Equipment: No  Physical Therapy Consult: No  Occupational Therapy Consult: No  Speech Therapy Consult: No  Current Support Network: Lives with Spouse, Family Lives [place], Own Home  Confirm Follow Up Transport: Family  Discharge Location  Discharge Placement: Home with family assistance    Tam Milligan

## 2020-04-14 NOTE — PROGRESS NOTES
0700 Bedside shift change report GIVEN TO Mary Sams RN. Report included the following information SBAR and Kardex. SIGNIFICANT CHANGES DURING SHIFT:  Patient did not have any hematemesis or N/V during shift. Patient had several watery loose stools through out shift. Stool studies collected and sent to lab. CONCERNS TO ADDRESS WITH MD:            [address] NURSING NOTE   Admission Date 4/13/2020   Admission Diagnosis Hematemesis [K92.0]   Consults IP CONSULT TO GASTROENTEROLOGY      Cardiac Monitoring [x] Yes [] No      Purposeful Hourly Rounding [x] Yes    Dom Score Total Score: 1   Dom score 3 or > [] Bed Alarm [] Avasys [] 1:1 sitter [] Patient refused (Signed refusal form in chart)   Jose G Score Jose G Score: 18   Jose G score 14 or < [] PMT consult [] Wound Care consult    []  Specialty bed  [] Nutrition consult      Influenza Vaccine Received Flu Vaccine for Current Season (usually Sept-March): Not Flu Season           Oxygen needs? [x] Room air Oxygen @  []1L    []2L    []3L   []4L    []5L   []6L via  NC   Chronic home O2 use? [] Yes [] No  Perform O2 challenge test and document in progress note using smartphrase (.Homeoxygen)      Last bowel movement Last Bowel Movement Date: 04/13/20      Urinary Catheter             LDAs               Peripheral IV 04/13/20 Left Forearm (Active)   Site Assessment Clean, dry, & intact 4/13/2020  8:17 PM   Phlebitis Assessment 0 4/13/2020  8:17 PM   Infiltration Assessment 0 4/13/2020  8:17 PM   Dressing Status Clean, dry, & intact 4/13/2020  8:17 PM   Dressing Type Tape;Transparent 4/13/2020  8:17 PM   Hub Color/Line Status Green; Infusing 4/13/2020  8:17 PM                         Readmission Risk Assessment Tool Score Low Risk            12       Total Score        2 . Living with Significant Other. Assisted Living. LTAC. SNF. or   Rehab    4 Pt.  Coverage (Medicare=5 , Medicaid, or Self-Pay=4)    6 Charlson Comorbidity Score (Age + Comorbid Conditions)        Criteria that do not apply:    Has Seen PCP in Last 6 Months (Yes=3, No=0)    Patient Length of Stay (>5 days = 3)    IP Visits Last 12 Months (1-3=4, 4=9, >4=11)       Expected Length of Stay - - -   Actual Length of Stay 1

## 2020-04-15 PROCEDURE — 74011250636 HC RX REV CODE- 250/636: Performed by: INTERNAL MEDICINE

## 2020-04-15 PROCEDURE — 74011000250 HC RX REV CODE- 250: Performed by: INTERNAL MEDICINE

## 2020-04-15 PROCEDURE — 65270000029 HC RM PRIVATE

## 2020-04-15 PROCEDURE — 94760 N-INVAS EAR/PLS OXIMETRY 1: CPT

## 2020-04-15 PROCEDURE — C9113 INJ PANTOPRAZOLE SODIUM, VIA: HCPCS | Performed by: INTERNAL MEDICINE

## 2020-04-15 RX ORDER — MAGNESIUM SULFATE HEPTAHYDRATE 40 MG/ML
2 INJECTION, SOLUTION INTRAVENOUS ONCE
Status: COMPLETED | OUTPATIENT
Start: 2020-04-15 | End: 2020-04-15

## 2020-04-15 RX ORDER — METRONIDAZOLE 500 MG/100ML
500 INJECTION, SOLUTION INTRAVENOUS EVERY 12 HOURS
Status: DISCONTINUED | OUTPATIENT
Start: 2020-04-15 | End: 2020-04-16 | Stop reason: HOSPADM

## 2020-04-15 RX ADMIN — Medication 10 ML: at 22:04

## 2020-04-15 RX ADMIN — MAGNESIUM SULFATE HEPTAHYDRATE 2 G: 40 INJECTION, SOLUTION INTRAVENOUS at 09:16

## 2020-04-15 RX ADMIN — Medication 10 ML: at 14:00

## 2020-04-15 RX ADMIN — METRONIDAZOLE 500 MG: 500 INJECTION, SOLUTION INTRAVENOUS at 15:14

## 2020-04-15 RX ADMIN — METRONIDAZOLE 500 MG: 500 INJECTION, SOLUTION INTRAVENOUS at 02:18

## 2020-04-15 RX ADMIN — FOLIC ACID: 5 INJECTION, SOLUTION INTRAMUSCULAR; INTRAVENOUS; SUBCUTANEOUS at 09:15

## 2020-04-15 RX ADMIN — SODIUM CHLORIDE 40 MG: 9 INJECTION INTRAMUSCULAR; INTRAVENOUS; SUBCUTANEOUS at 20:38

## 2020-04-15 RX ADMIN — SODIUM CHLORIDE AND POTASSIUM CHLORIDE: 9; 1.49 INJECTION, SOLUTION INTRAVENOUS at 03:49

## 2020-04-15 RX ADMIN — SODIUM CHLORIDE AND POTASSIUM CHLORIDE: 9; 1.49 INJECTION, SOLUTION INTRAVENOUS at 20:37

## 2020-04-15 RX ADMIN — SODIUM CHLORIDE 40 MG: 9 INJECTION INTRAMUSCULAR; INTRAVENOUS; SUBCUTANEOUS at 09:17

## 2020-04-15 RX ADMIN — Medication 10 ML: at 05:38

## 2020-04-15 NOTE — PROGRESS NOTES
General Surgery End of Shift Note      Bedside shift change report given to Lorrie Cortes RN (incoming nurse) by Derik Alcantara (outgoing nurse) on [facility]. Report included the following information SBAR, Kardex, Intake/Output and MAR. Shift Summary: Pt was admitted to the gen surg floor from CCU today. Pt remained stable throughout shift. Scheduled meds given, no PRN meds given, education provided. Hourly rounding completed, pt turns self, up ad jarret to the bathroom. Issues for Physician to Address:       Patient on Cardiac Monitoring?     [x] Yes  [] No    Rhythm: Telemetry: normal sinus rhythm           Derik Alcantara

## 2020-04-15 NOTE — PROGRESS NOTES
CLAUDIA: Home with Follow-up Appointment    10:40am- CM called Clemencia with [employer] via phone to inform her that CM has insurance letter. 10:35am- CM called pt's nurse to see if nurse can get insurance letter from pt due to COVID-19 restrictions. 10:30am- CM called Clemencia with [employer] via phone to inquire about pt's insurance or if pt was screened. Clemencia stated that pt was suppose to provide documentation of insurance. Clemencia stated that she has an ID for insurance but it's invalid. CM informed Clemencia that CM will check with nurse to see if pt has the letter from [employer]. CM will continue to follow patient for discharge planning needs and arrange for services as deemed necessary.     Aileen Garcia [address] [place] [address]  [phone]

## 2020-04-15 NOTE — PROGRESS NOTES
0700:   Bedside shift change report GIVEN TO Ritu Moore RN. Report included the following information SBAR, Kardex, Procedure Summary, Intake/Output, MAR, Recent Results and Cardiac Rhythm NSR.         SIGNIFICANT CHANGES DURING SHIFT:        CONCERNS TO ADDRESS WITH MD:            [address] NURSING NOTE   Admission Date 4/13/2020   Admission Diagnosis Hematemesis [K92.0]   Consults IP CONSULT TO GASTROENTEROLOGY      Cardiac Monitoring [x] Yes [] No      Purposeful Hourly Rounding [x] Yes    Dom Score Total Score: 1   Dom score 3 or > [] Bed Alarm [] Avasys [] 1:1 sitter [] Patient refused (Signed refusal form in chart)   Jose G Score Jose G Score: 19   Jose G score 14 or < [] PMT consult [] Wound Care consult    []  Specialty bed  [] Nutrition consult      Influenza Vaccine Received Flu Vaccine for Current Season (usually Sept-March): Not Flu Season           Oxygen needs? [x] Room air Oxygen @  []1L    []2L    []3L   []4L    []5L   []6L via  NC   Chronic home O2 use? [] Yes [] No  Perform O2 challenge test and document in progress note using [employer] (.Homeoxygen)      Last bowel movement Last Bowel Movement Date: 04/14/20      Urinary Catheter             LDAs               Peripheral IV 04/13/20 Left Forearm (Active)   Site Assessment Clean, dry, & intact 4/14/2020 11:02 PM   Phlebitis Assessment 0 4/14/2020 11:02 PM   Infiltration Assessment 0 4/14/2020 11:02 PM   Dressing Status Clean, dry, & intact 4/14/2020 11:02 PM   Dressing Type Transparent;Tape 4/14/2020 11:02 PM   Hub Color/Line Status Green; Infusing;Flushed 4/14/2020 11:02 PM                         Readmission Risk Assessment Tool Score Low Risk            12       Total Score        2 . Living with Significant Other. Assisted Living. LTAC. SNF. or   Rehab    4 Pt.  Coverage (Medicare=5 , Medicaid, or Self-Pay=4)    6 Charlson Comorbidity Score (Age + Comorbid Conditions)        Criteria that do not apply:    Has Seen PCP in Last 6 Months (Yes=3, No=0)    Patient Length of Stay (>5 days = 3)    IP Visits Last 12 Months (1-3=4, 4=9, >4=11)       Expected Length of Stay 3d 16h   Actual Length of Stay 2

## 2020-04-15 NOTE — PROGRESS NOTES
Bedside shift change report given to Yahaira Lion RN by Sindi Kingsley. Report included the following information SBAR, ED Summary, Intake/Output, MAR, Recent Results and Cardiac Rhythm NSR/tach.

## 2020-04-15 NOTE — PROGRESS NOTES
Hospitalist Progress Note    NAME: Kenneth Quiros   :  1955   MRN:  [id number]     Interim Hospital Summary: 62 y.o. female whom presented on 2020 with      Assessment / Plan:    Severe sepsis   Nausea, vomiting, hematemesis  Diarrhea with ileitis/colitis on CT  -Patient presenting with nausea, vomiting, hematemesis and also dark stools  -CT abdomen  IMPRESSION:   1. There is a short segment of concentric mural thickening in the terminal ileum  which may represent an infectious/inflammatory process. 2. Short segment of concentric mural thickening in the sigmoid colon/rectum  which could represent colitis/proctitis. 3. Decompressed gallbladder with questionable gallbladder wall thickening and  possible hyperemia. 4. Hepatic steatosis with heterogeneous areas of what appears to be sparing.    -Enteric pathogens NEG. C difficile NEG. No fecal leukocytes  -UA negative  -COVID 19- NEG  -Hg stable - partly dilution. Switch to PO protonix in AM if stable  -tolerated clears yesterday. Will advance diet today  -switch to PO abx in AM if stable. DC planning for tomorrow. Follow up with GI for outpatient elective endoscopy. Will text Dr Yamilex Deal     Hypokalemia  Hypomagnesemia  -added kcl to IVFs. Replete Mg     Acute transaminitis   Hyperbilirubinemia  -Could be related to alcohol versus coronavirus vs sepsis  -Ultrasound shows normal gallbladder  -follow LFTs     Chronic alcoholism with concern for alcohol withdrawal  -monitor closely. CIWA       Code Status: full   Surrogate Decision Maker: Spouse Thomas     DVT Prophylaxis: Scd's        Subjective:     Chief Complaint / Reason for Physician Visit  Follow up of NV D, LFT elevation ETOH  Chart reviewed in detail. Discussed with RN events overnight.        Review of Systems:  Symptom Y/N Comments  Symptom Y/N Comments   Fever/Chills    Chest Pain     Poor Appetite    Edema     Cough    Abdominal Pain     Sputum Joint Pain     SOB/CALLES    Pruritis/Rash     Nausea/vomit y   Tolerating PT/OT     Diarrhea y   Tolerating Diet     Constipation    Other       Could NOT obtain due to:      PO intake: No data found. Objective:     VITALS:   Last 24hrs VS reviewed since prior progress note. Most recent are:  Patient Vitals for the past 24 hrs:   Temp Pulse Resp BP SpO2   04/15/20 0821 98.4 °F (36.9 °C) 84 16 122/75 98 %   04/15/20 0348 98.2 °F (36.8 °C) 89 18 144/83 99 %   04/14/20 2302 98.5 °F (36.9 °C) 94 18 152/85 98 %   04/14/20 2004 98.1 °F (36.7 °C) 86 18 143/85 99 %   04/14/20 1454 98.3 °F (36.8 °C) 100 18 137/85 99 %   04/14/20 1148 98.3 °F (36.8 °C) -- 18 129/70 --       Intake/Output Summary (Last 24 hours) at 4/15/2020 0857  Last data filed at 4/15/2020 0349  Gross per 24 hour   Intake 1020 ml   Output --   Net 1020 ml        PHYSICAL EXAM:  General: WD, WN. Alert, cooperative, no acute distress    EENT:  EOMI. Anicteric sclerae. MMM  Resp:  CTA bilaterally, no wheezing or rales. No accessory muscle use  CV:  Regular  rhythm,  No edema  GI:  Soft, Non distended, Non tender.  +Bowel sounds  Neurologic:  Alert and oriented X 3, normal speech,   Psych:   Good insight. Not anxious nor agitated  Skin:  No rashes. No jaundice    Reviewed most current lab test results and cultures  YES  Reviewed most current radiology test results   YES  Review and summation of old records today    NO  Reviewed patient's current orders and MAR    YES  PMH/SH reviewed - no change compared to H&P  ________________________________________________________________________  Care Plan discussed with:    Comments   Patient x    Family      RN x    Care Manager     Consultant                        Multidiciplinary team rounds were held today with , nursing, pharmacist and clinical coordinator. Patient's plan of care was discussed; medications were reviewed and discharge planning was addressed. ________________________________________________________________________  Total NON critical care TIME:  25   Minutes    Total CRITICAL CARE TIME Spent:   Minutes non procedure based      Comments   >50% of visit spent in counseling and coordination of care x     This includes time during multidisciplinary rounds if indicated above   ________________________________________________________________________  Salud Barth MD     Procedures: see electronic medical records for all procedures/Xrays and details which were not copied into this note but were reviewed prior to creation of Plan. LABS:  I reviewed today's most current labs and imaging studies.   Pertinent labs include:  Recent Labs     04/14/20  0825 04/14/20  0035 04/13/20  1356   WBC  --  6.1 7.8   HGB 9.5* 10.4* 11.9   HCT  --  31.5* 34.8*   PLT  --  114* 135*     Recent Labs     04/14/20  0035 04/13/20  1405 04/13/20  1356     --  138   K 3.2*  --  3.1*   *  --  104   CO2 24  --  28   *  --  166*   BUN 20  --  30*   CREA 0.49*  --  0.67   CA 8.1*  --  9.1   MG 1.4*  --   --    ALB 3.0*  --  3.4*   TBILI 1.0  --  1.4*   SGOT 82*  --  122*   ALT 51  --  68   INR  --  1.4*  --

## 2020-04-15 NOTE — PROGRESS NOTES
F/U for diarrhea, abnormal CT, N/V/hematemesis, fever, alcohol abuse, elevated LFT's    S: Ms. Terrence Jerez was seen by me today during rounds. At this time, she is resting comfortably. The patient has no new complaints today. Please see admission consult for details of ROS; there are + changes today. She is negative for COVID19. Her diarrhea seems to have resolved. She only had one episode yesterday earlier in the day. No BM's since. Feels slightly bloated but no pain or fever. No melena or hematochezia. No further N/V or hematemesis. Is tolerating clear liquids. Last hgb was yesterday and was 9.5. LFT's improving and bili normal.  U/S negative for cirrhosis. CT findings reviewed (see below). Stool for WBCs, enteric pathogens and C. Diff negative. O: Blood pressure 122/75, pulse 84, temperature 98.4 °F (36.9 °C), resp. rate 16, SpO2 98 %. Gen: Patient is in no acute distress. There is no jaundice. Lungs: Clear to auscultation bilaterally . Heart:RRR. Abd: Soft, nontender, non-distended, bowel sounds present. Extremities: Warm. Cross sectional imaging:    CT Results (most recent):  Results from Hospital Encounter encounter on 04/13/20   CT ABD PELV W CONT    Narrative EXAM:  CT ABDOMEN PELVIS WITH CONTRAST  INDICATION:  r/o colitis, intra abd source of fever. Additional history:  COMPARISON: CT of the abdomen and pelvis, 2/25/2014. Ultrasound of the abdomen,  earlier same day. .  TECHNIQUE:   Multislice helical CT was performed from the diaphragm to the symphysis pubis  with oral and intravenous contrast administration. Contiguous 5 mm axial images  were reconstructed and lung and soft tissue windows were generated. Coronal and  sagittal reformations were generated. CT dose reduction was achieved through use of a standardized protocol tailored  for this examination and automatic exposure control for dose modulation. Tashia Dye   FINDINGS:  INCIDENTALLY IMAGED CHEST:  Heart/vessels: Within normal limits. Lungs/Pleura: Within normal limits. .  ABDOMEN:  Liver: Diffuse, diminished attenuation throughout the liver with some  heterogeneity at the margin of the lateral hepatic dome. Gallbladder/Biliary: Decompressed gallbladder with questionable hyperemia and  thickening in the gallbladder wall. Spleen: Within normal limits. Pancreas: Within normal limits. Adrenals: Within normal limits. Kidneys: Within normal limits. Peritoneum/Mesenteries: Within normal limits. Extraperitoneum: Within normal limits. Gastrointestinal tract: There is concentric mural thickening in the terminal  ileum over a short segment. Diverticulosis in the ascending colon. There is a  short segment of concentric mural thickening in the sigmoid colon/rectum. Vascular: Within normal limits. Elmer Benjamin PELVIS:  Extraperitoneum: Within normal limits. Ureters: Within normal limits. Bladder: Within normal limits. Reproductive System: Status post hysterectomy. .  MSK:   Tiny, fat-containing umbilical hernia. .    Impression IMPRESSION:   1. There is a short segment of concentric mural thickening in the terminal ileum  which may represent an infectious/inflammatory process. 2. Short segment of concentric mural thickening in the sigmoid colon/rectum  which could represent colitis/proctitis. 3. Decompressed gallbladder with questionable gallbladder wall thickening and  possible hyperemia. 4. Hepatic steatosis with heterogeneous areas of what appears to be sparing. 5. Incidental findings as above. A: Active Problems:    Elevated LFTs (4/13/2020)      Hematemesis (4/13/2020)      Nausea vomiting and diarrhea (4/13/2020)      Alcohol abuse (4/13/2020)        Comment:  Her GI c/o have resolved. P: Advance diet. Suspect she may have had an infectious gastroenteritis (viral) plus molly covington tear causing the hematemesis. No signs of cirrhosis to suggest varices.   Follow up as an outpatient with repeat colonoscopy/CT due to abnormal CT findings.       MARIIA Whipple  04/15/20  11:18 AM

## 2020-04-15 NOTE — PROGRESS NOTES
Problem: Risk for Spread of Infection  Goal: Prevent transmission of infectious organism to others  Description: Prevent the transmission of infectious organisms to other patients, staff members, and visitors. Outcome: Progressing Towards Goal     Problem: Falls - Risk of  Goal: *Absence of Falls  Description: Document Susu Flow Fall Risk and appropriate interventions in the flowsheet. Outcome: Progressing Towards Goal  Note: Fall Risk Interventions:            Medication Interventions: Patient to call before getting OOB                   Problem: Pressure Injury - Risk of  Goal: *Prevention of pressure injury  Description: Document Jose G Scale and appropriate interventions in the flowsheet.   Outcome: Progressing Towards Goal  Note: Pressure Injury Interventions:       Moisture Interventions: Absorbent underpads    Activity Interventions: Increase time out of bed    Mobility Interventions: Pressure redistribution bed/mattress (bed type)    Nutrition Interventions: Document food/fluid/supplement intake    Friction and Shear Interventions: Feet elevated on foot rest

## 2020-04-15 NOTE — PROGRESS NOTES
TRANSFER - OUT REPORT:    Verbal report given to Ana MASCORRO on Saqib  being transferred to Surgical Telemetry for routine progression of care       Report consisted of patients Situation, Background, Assessment and   Recommendations(SBAR). Information from the following report(s) SBAR, ED Summary, Intake/Output, MAR, Recent Results and Cardiac Rhythm NSR was reviewed with the receiving nurse. Lines:   Peripheral IV 04/13/20 Left Forearm (Active)   Site Assessment Clean, dry, & intact 4/15/2020  8:00 AM   Phlebitis Assessment 0 4/15/2020  8:00 AM   Infiltration Assessment 0 4/15/2020  8:00 AM   Dressing Status Clean, dry, & intact 4/15/2020  8:00 AM   Dressing Type Tape;Transparent 4/15/2020  8:00 AM   Hub Color/Line Status Green; Infusing 4/15/2020  8:00 AM        Opportunity for questions and clarification was provided.       Patient transported with:   [employer]

## 2020-04-15 NOTE — PROGRESS NOTES
Pharmacy Automatic Renal Dosing Protocol - Antimicrobials    Indication for Antimicrobials: IAI    Current Regimen of Each Antimicrobial:  Ceftriaxone 1g IV Q24H (Start Date ; Day # 3/3)  Metronidzole 500 mg Q8H (Start Date ; Day #3)    Previous Antimicrobial Therapy:      Significant Cultures:    Blood - NG - Prelim   C Diff - Negative    Radiology / Imaging results: (X-ray, CT scan or MRI):     Paralysis, amputations, malnutrition:     Labs:  Recent Labs     20  0035 20  1356   CREA 0.49* 0.67   BUN 20 30*   WBC 6.1 7.8     Temp (24hrs), Av.3 °F (36.8 °C), Min:98.1 °F (36.7 °C), Max:98.5 °F (36.9 °C)    Creatinine Clearance (mL/min) or Dialysis: N/A    Impression/Plan:   Changing Flagyl to q12h per protocol since c.diff negative  Antimicrobial stop date: CTX 3 days - last dose tonight, flagyl TBD     Pharmacy will follow daily and adjust medications as appropriate for renal function and/or serum levels. Thank you,  Leonard Waters, PHARMD    Recommended duration of therapy  http://[facility]/[place]/[facility]/Pharmacy/Clinical%20Companion/Duration%20of%20ABX%20therapy. docx    Renal Dosing  http://[facility]/[place]/[facility]/Pharmacy/Clinical%20Companion/Renal%[email or web address]. pdf

## 2020-04-16 VITALS
TEMPERATURE: 98.6 F | OXYGEN SATURATION: 96 % | RESPIRATION RATE: 16 BRPM | HEART RATE: 85 BPM | SYSTOLIC BLOOD PRESSURE: 134 MMHG | DIASTOLIC BLOOD PRESSURE: 62 MMHG

## 2020-04-16 LAB
ALBUMIN SERPL-MCNC: 3 G/DL (ref 3.5–5)
ALBUMIN/GLOB SERPL: 0.8 {RATIO} (ref 1.1–2.2)
ALP SERPL-CCNC: 116 U/L (ref 45–117)
ALT SERPL-CCNC: 79 U/L (ref 12–78)
ANION GAP SERPL CALC-SCNC: 6 MMOL/L (ref 5–15)
AST SERPL-CCNC: 174 U/L (ref 15–37)
BASOPHILS # BLD: 0 K/UL (ref 0–0.1)
BASOPHILS NFR BLD: 0 % (ref 0–1)
BILIRUB SERPL-MCNC: 1.2 MG/DL (ref 0.2–1)
BUN SERPL-MCNC: 3 MG/DL (ref 6–20)
BUN/CREAT SERPL: 8 (ref 12–20)
CALCIUM SERPL-MCNC: 7.6 MG/DL (ref 8.5–10.1)
CHLORIDE SERPL-SCNC: 110 MMOL/L (ref 97–108)
CO2 SERPL-SCNC: 24 MMOL/L (ref 21–32)
CREAT SERPL-MCNC: 0.38 MG/DL (ref 0.55–1.02)
DIFFERENTIAL METHOD BLD: ABNORMAL
EOSINOPHIL # BLD: 0.1 K/UL (ref 0–0.4)
EOSINOPHIL NFR BLD: 2 % (ref 0–7)
ERYTHROCYTE [DISTWIDTH] IN BLOOD BY AUTOMATED COUNT: 12.5 % (ref 11.5–14.5)
GLOBULIN SER CALC-MCNC: 3.8 G/DL (ref 2–4)
GLUCOSE SERPL-MCNC: 94 MG/DL (ref 65–100)
HCT VFR BLD AUTO: 28.3 % (ref 35–47)
HGB BLD-MCNC: 9.4 G/DL (ref 11.5–16)
IMM GRANULOCYTES # BLD AUTO: 0 K/UL (ref 0–0.04)
IMM GRANULOCYTES NFR BLD AUTO: 0 % (ref 0–0.5)
LYMPHOCYTES # BLD: 0.7 K/UL (ref 0.8–3.5)
LYMPHOCYTES NFR BLD: 19 % (ref 12–49)
MAGNESIUM SERPL-MCNC: 2.1 MG/DL (ref 1.6–2.4)
MAGNESIUM SERPL-MCNC: 2.2 MG/DL (ref 1.6–2.4)
MCH RBC QN AUTO: 34.8 PG (ref 26–34)
MCHC RBC AUTO-ENTMCNC: 33.2 G/DL (ref 30–36.5)
MCV RBC AUTO: 104.8 FL (ref 80–99)
MONOCYTES # BLD: 0.3 K/UL (ref 0–1)
MONOCYTES NFR BLD: 8 % (ref 5–13)
NEUTS SEG # BLD: 2.7 K/UL (ref 1.8–8)
NEUTS SEG NFR BLD: 71 % (ref 32–75)
NRBC # BLD: 0 K/UL (ref 0–0.01)
NRBC BLD-RTO: 0 PER 100 WBC
O+P SPEC MICRO: NORMAL
O+P STL CONC: NORMAL
PLATELET # BLD AUTO: 116 K/UL (ref 150–400)
PMV BLD AUTO: 10.6 FL (ref 8.9–12.9)
POTASSIUM SERPL-SCNC: 2.8 MMOL/L (ref 3.5–5.1)
POTASSIUM SERPL-SCNC: 3.2 MMOL/L (ref 3.5–5.1)
PROT SERPL-MCNC: 6.8 G/DL (ref 6.4–8.2)
RBC # BLD AUTO: 2.7 M/UL (ref 3.8–5.2)
RBC MORPH BLD: ABNORMAL
SODIUM SERPL-SCNC: 140 MMOL/L (ref 136–145)
SPECIMEN SOURCE: NORMAL
WBC # BLD AUTO: 3.8 K/UL (ref 3.6–11)

## 2020-04-16 PROCEDURE — 36415 COLL VENOUS BLD VENIPUNCTURE: CPT

## 2020-04-16 PROCEDURE — C9113 INJ PANTOPRAZOLE SODIUM, VIA: HCPCS | Performed by: INTERNAL MEDICINE

## 2020-04-16 PROCEDURE — 74011250636 HC RX REV CODE- 250/636: Performed by: INTERNAL MEDICINE

## 2020-04-16 PROCEDURE — 85025 COMPLETE CBC W/AUTO DIFF WBC: CPT

## 2020-04-16 PROCEDURE — 80053 COMPREHEN METABOLIC PANEL: CPT

## 2020-04-16 PROCEDURE — 74011250637 HC RX REV CODE- 250/637: Performed by: INTERNAL MEDICINE

## 2020-04-16 PROCEDURE — 83735 ASSAY OF MAGNESIUM: CPT

## 2020-04-16 PROCEDURE — 84132 ASSAY OF SERUM POTASSIUM: CPT

## 2020-04-16 PROCEDURE — 74011000258 HC RX REV CODE- 258: Performed by: INTERNAL MEDICINE

## 2020-04-16 PROCEDURE — 94760 N-INVAS EAR/PLS OXIMETRY 1: CPT

## 2020-04-16 PROCEDURE — 74011000250 HC RX REV CODE- 250: Performed by: INTERNAL MEDICINE

## 2020-04-16 RX ORDER — POTASSIUM CHLORIDE 750 MG/1
40 TABLET, FILM COATED, EXTENDED RELEASE ORAL
Status: COMPLETED | OUTPATIENT
Start: 2020-04-16 | End: 2020-04-16

## 2020-04-16 RX ORDER — POTASSIUM CHLORIDE 1500 MG/1
20 TABLET, FILM COATED, EXTENDED RELEASE ORAL DAILY
Qty: 7 TAB | Refills: 0 | Status: SHIPPED | OUTPATIENT
Start: 2020-04-16 | End: 2020-04-23

## 2020-04-16 RX ORDER — LANOLIN ALCOHOL/MO/W.PET/CERES
400 CREAM (GRAM) TOPICAL DAILY
Qty: 7 TAB | Refills: 0 | Status: SHIPPED | OUTPATIENT
Start: 2020-04-16 | End: 2020-04-23

## 2020-04-16 RX ORDER — LOPERAMIDE HCL 2 MG
2 TABLET ORAL
Qty: 30 TAB | Refills: 0 | Status: SHIPPED | OUTPATIENT
Start: 2020-04-16 | End: 2020-04-22

## 2020-04-16 RX ORDER — POTASSIUM CHLORIDE 750 MG/1
40 TABLET, FILM COATED, EXTENDED RELEASE ORAL EVERY 4 HOURS
Status: DISCONTINUED | OUTPATIENT
Start: 2020-04-16 | End: 2020-04-16

## 2020-04-16 RX ORDER — PANTOPRAZOLE SODIUM 40 MG/1
40 TABLET, DELAYED RELEASE ORAL DAILY
Qty: 30 TAB | Refills: 1 | Status: SHIPPED | OUTPATIENT
Start: 2020-04-16 | End: 2020-05-30

## 2020-04-16 RX ADMIN — METRONIDAZOLE 500 MG: 500 INJECTION, SOLUTION INTRAVENOUS at 03:31

## 2020-04-16 RX ADMIN — POTASSIUM CHLORIDE 40 MEQ: 750 TABLET, FILM COATED, EXTENDED RELEASE ORAL at 15:22

## 2020-04-16 RX ADMIN — SODIUM CHLORIDE 40 MG: 9 INJECTION INTRAMUSCULAR; INTRAVENOUS; SUBCUTANEOUS at 09:00

## 2020-04-16 RX ADMIN — CEFTRIAXONE 1 G: 1 INJECTION, POWDER, FOR SOLUTION INTRAMUSCULAR; INTRAVENOUS at 00:18

## 2020-04-16 RX ADMIN — FOLIC ACID: 5 INJECTION, SOLUTION INTRAMUSCULAR; INTRAVENOUS; SUBCUTANEOUS at 10:44

## 2020-04-16 RX ADMIN — POTASSIUM CHLORIDE 40 MEQ: 750 TABLET, FILM COATED, EXTENDED RELEASE ORAL at 09:12

## 2020-04-16 NOTE — DISCHARGE INSTRUCTIONS
HOSPITALIST DISCHARGE INSTRUCTIONS    NAME: Lalo Hadley   :  1955   MRN:  [id number]     Date/Time:  2020 8:49 AM    ADMIT DATE: 2020   DISCHARGE DATE: 2020         · It is important that you take the medication exactly as they are prescribed. · Keep your medication in the bottles provided by the pharmacist and keep a list of the medication names, dosages, and times to be taken in your wallet. · Do not take other medications without consulting your doctor. What to do at Home    Recommended diet:  Regular Diet    Recommended activity: Activity as tolerated      If you have questions regarding the hospital related prescriptions or hospital related issues please call [facility] at [phone]. You can always direct your questions to your primary care doctor if you are unable to reach your hospital physician; your PCP works as an extension of your hospital doctor just like your hospital doctor is an extension of your PCP for your time at the hospital [facility])    If you experience any of the following symptoms then please call your primary care physician or return to the emergency room if you cannot get hold of your doctor:    Fever, chills, nausea, vomiting, or persistent diarrhea  Worsening weakness or new problems with your speech or balance  Dark stools or visible blood in your stools  New Leg swelling or shortness of breath as these could be signs of a clot    Additional Instructions:      Bring these papers with you to your follow up appointments. The papers will help your doctors be sure to continue the care plan from the hospital.              Information obtained by :  I understand that if any problems occur once I am at home I am to contact my physician. I understand and acknowledge receipt of the instructions indicated above. Physician's or R.N.'s Signature                                                                  Date/Time                                                                                                                                              Patient or Representative Signature

## 2020-04-16 NOTE — PROGRESS NOTES
CLAUDIA PLAN--Home with family. Patient is being discharged home today and she states her  will be transporting her. She will follow up with medical care team when discharged. Rhona Carlson RN BSN CRM        [phone]

## 2020-04-16 NOTE — PROGRESS NOTES
General Surgery End of Shift Nursing Note    Bedside shift change report given to Beth (oncoming nurse) by Mindi Ac (offgoing nurse). Report included the following information SBAR, Kardex and Procedure Summary. Shift worked:   [id number]   Summary of shift:    Patient has been resting comfortably and ambulating to bedside commode as needed. Potassium was 2.8 this morning compared to 3.2 on 4/14. Telehospitalist notified. New order for potassium and magnesium lab to be drawn at 1300. Oncoming nurse aware.    Issues for physician to address:   potassium       GI:    Current diet:  DIET GI LITE (POST SURGICAL)    Tolerating current diet: YES  Passing flatus: YES  Last Bowel Movement: today   Appearance: watery    Janette Phillip RN

## 2020-04-16 NOTE — PROGRESS NOTES
Problem: Risk for Spread of Infection  Goal: Prevent transmission of infectious organism to others  Description: Prevent the transmission of infectious organisms to other patients, staff members, and visitors.   Outcome: Resolved/Met     Problem: Patient Education:  Go to Education Activity  Goal: Patient/Family Education  Outcome: Resolved/Met     Problem: Patient Education: Go to Patient Education Activity  Goal: Patient/Family Education  Outcome: Resolved/Met     Problem: Upper and Lower GI Bleed: Day 1  Goal: Off Pathway (Use only if patient is Off Pathway)  Outcome: Resolved/Met  Goal: Activity/Safety  Outcome: Resolved/Met  Goal: Consults, if ordered  Outcome: Resolved/Met  Goal: Diagnostic Test/Procedures  Outcome: Resolved/Met  Goal: Nutrition/Diet  Outcome: Resolved/Met  Goal: Discharge Planning  Outcome: Resolved/Met  Goal: Medications  Outcome: Resolved/Met  Goal: Respiratory  Outcome: Resolved/Met  Goal: Treatments/Interventions/Procedures  Outcome: Resolved/Met  Goal: Psychosocial  Outcome: Resolved/Met  Goal: *Optimal pain control at patient's stated goal  Outcome: Resolved/Met  Goal: *Hemodynamically stable  Outcome: Resolved/Met  Goal: *Demonstrates progressive activity  Outcome: Resolved/Met     Problem: Upper and Lower GI Bleed: Day 2  Goal: Off Pathway (Use only if patient is Off Pathway)  Outcome: Resolved/Met  Goal: Activity/Safety  Outcome: Resolved/Met  Goal: Consults, if ordered  Outcome: Resolved/Met  Goal: Diagnostic Test/Procedures  Outcome: Resolved/Met  Goal: Nutrition/Diet  Outcome: Resolved/Met  Goal: Discharge Planning  Outcome: Resolved/Met  Goal: Medications  Outcome: Resolved/Met  Goal: Respiratory  Outcome: Resolved/Met  Goal: Treatments/Interventions/Procedures  Outcome: Resolved/Met  Goal: Psychosocial  Outcome: Resolved/Met  Goal: *Optimal pain control at patient's stated goal  Outcome: Resolved/Met  Goal: *Hemodynamically stable  Outcome: Resolved/Met  Goal: *Tolerating diet  Outcome: Resolved/Met  Goal: *Demonstrates progressive activity  Outcome: Resolved/Met     Problem: Upper and Lower GI Bleed: Day 3  Goal: Off Pathway (Use only if patient is Off Pathway)  Outcome: Resolved/Met  Goal: Activity/Safety  Outcome: Resolved/Met  Goal: Diagnostic Test/Procedures  Outcome: Resolved/Met  Goal: Nutrition/Diet  Outcome: Resolved/Met  Goal: Discharge Planning  Outcome: Resolved/Met  Goal: Medications  Outcome: Resolved/Met  Goal: Treatments/Interventions/Procedures  Outcome: Resolved/Met  Goal: Psychosocial  Outcome: Resolved/Met     Problem: Upper and Lower GI Bleed:  Discharge Outcomes  Goal: *Hemodynamically stable  Outcome: Resolved/Met  Goal: *Lungs clear or at baseline  Outcome: Resolved/Met  Goal: *Demonstrates independent activity or return to baseline  Outcome: Resolved/Met  Goal: *Pain is controlled to three or less  Outcome: Resolved/Met  Goal: *Verbalizes understanding and describes prescribed diet  Outcome: Resolved/Met  Goal: *Tolerating diet  Outcome: Resolved/Met  Goal: *Verbalizes name, dosage, time, side effects, and number of days to continue medications  Outcome: Resolved/Met  Goal: *Anxiety reduced or absent  Outcome: Resolved/Met  Goal: *Understands and describes signs and symptoms to report to providers(Stroke Metric)  Outcome: Resolved/Met  Goal: *Describes follow-up/return visits to physicians  Outcome: Resolved/Met  Goal: *Describes available resources and support systems  Outcome: Resolved/Met     Problem: Falls - Risk of  Goal: *Absence of Falls  Description: Document Dom Fall Risk and appropriate interventions in the flowsheet.   Outcome: Resolved/Met  Note: Fall Risk Interventions:            Medication Interventions: Teach patient to arise slowly                   Problem: Patient Education: Go to Patient Education Activity  Goal: Patient/Family Education  Outcome: Resolved/Met     Problem: Pressure Injury - Risk of  Goal: *Prevention of pressure injury  Description: Document Jose G Scale and appropriate interventions in the flowsheet.   Outcome: Resolved/Met  Note: Pressure Injury Interventions:       Moisture Interventions: Absorbent underpads    Activity Interventions: Increase time out of bed    Mobility Interventions: Pressure redistribution bed/mattress (bed type)    Nutrition Interventions: Document food/fluid/supplement intake    Friction and Shear Interventions: HOB 30 degrees or less                Problem: Patient Education: Go to Patient Education Activity  Goal: Patient/Family Education  Outcome: Resolved/Met     Problem: Knowledge deficit on COVID-19  Goal: *Able to cope  Outcome: Resolved/Met

## 2020-04-16 NOTE — PROGRESS NOTES
I have reviewed discharge instructions with the PATIENT PARENT GUARDIAN: patient. The patient verbalized understanding. Discharge medications reviewed with patient and appropriate educational materials and side effects teaching were provided. Follow-up appointments reviewed. Opportunity for questions and clarification was provided. Venous access removed without difficulty. Patient's belongings gathered and sent with patient. Patient is ready for discharge.      Janay Domingo

## 2020-04-16 NOTE — ROUTINE PROCESS
The following appointments have been successfully scheduled:    Date/time Wednesday, April 22, 2020 10:00 AM  Patient  Mark Ruiz 1955 ([id number] F) #[id number]  Department [facility] OFFICE-[address]  Appointment type Telemedicine 30 My Chart  Provider Kelly Myers  Appointment type notes Telemedicine 30 My Chart  For visits related to COVID

## 2020-04-16 NOTE — DISCHARGE SUMMARY
Hospitalist Discharge Summary     Patient ID:  Celso Kuhn  [id number]  62 y.o.  1955    PCP on record: Britney Garcia NP    Admit date: 4/13/2020  Discharge date and time: 4/16/2020      DISCHARGE DIAGNOSIS:    Severe sepsis due to viral gastroenteritis  Hematemesis probably from a MW tear  Hypokalemia  Hypomagnesemia  Acute transaminitis   Hyperbilirubinemia  Chronic alcoholism       CONSULTATIONS:  IP CONSULT TO GASTROENTEROLOGY    Excerpted HPI from H&P of Jimena Cuevas MD:  CHIEF COMPLAINT: Nausea, vomiting, hematemesis     HISTORY OF PRESENT ILLNESS:     Celso Kuhn is a 62 y.o.   female who presents with no past medical history of osteoporosis, dyslipidemia is coming to the hospital chief complaints of nausea, vomiting, hematemesis and diarrhea since last 3 to 4 days. Patient reports being in her usual state of health until about 4 days ago when she started not feeling well. She reports that she is having nausea along with vomiting and is not able to keep anything down. She also reports no blood in her vomit. She also reports diarrhea with dark stools as well. She also reports cough with small amount of whitish phlegm. She does not report any shortness of breath. She also reports fever with chills at home. She does not report any chest pain. She does not report any exposure to sick contacts. Denies exposure to anyone with positive coronavirus infection.     On arrival to the hospital, she was noted to have fever of 100.5, was tachycardic and blood pressure was also elevated at 166/98. On labs she was noted to have a hemoglobin of 11.9. BMP showed a potassium of 3.1, creatinine was normal.  ALT and AST were elevated at 68 and 122. Lactic acid was elevated at 2.3. Total bilirubin was 1.4.   Chest x-ray shows no acute process.       ______________________________________________________________________  DISCHARGE SUMMARY/HOSPITAL COURSE:  for full details see H&P, daily progress notes, labs, consult notes. Severe sepsis due to viral gastroenteritis  Hematemesis probably from a MW tear  -Patient presenting with nausea, vomiting, hematemesis and also dark stools  -CT abdomen  IMPRESSION:   1. There is a short segment of concentric mural thickening in the terminal ileum  which may represent an infectious/inflammatory process. 2. Short segment of concentric mural thickening in the sigmoid colon/rectum  which could represent colitis/proctitis. 3. Decompressed gallbladder with questionable gallbladder wall thickening and  possible hyperemia. 4. Hepatic steatosis with heterogeneous areas of what appears to be sparing.     -Enteric pathogens NEG. C difficile NEG. No fecal leukocytes  -UA negative  -COVID 19- NEG  -Hg stable. DC on Protonix. Stop H2B  -advanced diet and she did well. Suspect viral GE. Stopped abx   -Follow up with GI for outpatient elective endoscopy.       Hypokalemia  Hypomagnesemia  -repleting     Acute transaminitis   Hyperbilirubinemia  -Ultrasound shows normal gallbladder  -minimal elevation and likely related to sepsis and / or ETOH     Chronic alcoholism with concern for alcohol withdrawal  -no issues with withdrawal.  Counseled      _______________________________________________________________________  Patient seen and examined by me on discharge day. Pertinent Findings:  Gen:    Not in distress  Chest: Clear lungs  CVS:   Regular rhythm. No edema  Abd:  Soft, not distended, not tender  Neuro:  Alert, Oriented x 4, grossly non focal exam  _______________________________________________________________________  DISCHARGE MEDICATIONS:   Current Discharge Medication List      START taking these medications    Details   potassium chloride SR (K-TAB) 20 mEq tablet Take 1 Tab by mouth daily for 7 doses. Qty: 7 Tab, Refills: 0      pantoprazole (Protonix) 40 mg tablet Take 1 Tab by mouth daily for 60 days.   Qty: 30 Tab, Refills: 1      loperamide (IMMODIUM) 2 mg tablet Take 1 Tab by mouth four (4) times daily as needed for Diarrhea for up to 10 days. Qty: 30 Tab, Refills: 0      magnesium oxide (MAG-OX) 400 mg tablet Take 1 Tab by mouth daily for 7 days. Qty: 7 Tab, Refills: 0         CONTINUE these medications which have NOT CHANGED    Details   ascorbic acid, vitamin C, (Vitamin C) 500 mg tablet Take 1,000 mg by mouth daily. cholecalciferol, vitamin D3, (Vitamin D3) 50 mcg (2,000 unit) tab Take  by mouth daily as needed. acetaminophen (TylenoL) 325 mg tablet Take 325 mg by mouth every four (4) hours as needed for Pain. STOP taking these medications       MULTIVITAMIN PO Comments:   Reason for Stopping:         phytonadione, vit K1, (VITAMIN K1) Comments:   Reason for Stopping:         famotidine (Pepcid AC) 20 mg tablet Comments:   Reason for Stopping:         aspirin delayed-release 81 mg tablet Comments:   Reason for Stopping:               My Recommended Diet, Activity, Wound Care, and follow-up labs are listed in the patient's Discharge Insturctions which I have personally completed and reviewed.   Risk of deterioration: Low    Condition at Discharge:  Stable  _____________________________________________________________________    Disposition  Home with family, no needs  ____________________________________________________________________    Care Plan discussed with:   Patient, Family, RN, Care Manager, Consultant    ____________________________________________________________________    Code Status: Full Code  ____________________________________________________________________      Condition at Discharge:  Stable  _____________________________________________________________________  Follow up with:   PCP : Ceferino Patel NP  Follow-up Information     Follow up With Specialties Details Why Contact Info    Ceferino Patel NP Family Practice In 1 week  Shiv Crow [facility] [id number] [facility], Nicole Jurado MD Gastroenterology In 2 weeks  [address] [facility]  [phone]                Total time in minutes spent coordinating this discharge (includes going over instructions, follow-up, prescriptions, and preparing report for sign off to her PCP) :  35 minutes    Signed:  Salud Barth MD

## 2020-04-17 ENCOUNTER — PATIENT OUTREACH (OUTPATIENT)
Dept: FAMILY MEDICINE CLINIC | Age: 65
End: 2020-04-17

## 2020-04-17 NOTE — PROGRESS NOTES
Patient contacted regarding recent discharge and COVID-19 risk     20- COVID-19 test result- negative    Care Transition Nurse/ Ambulatory Care Manager contacted the patient by telephone to perform post discharge assessment. Verified name and  with patient as identifiers. Patient has following risk factors of: sepsis. CTN/ACM reviewed discharge instructions, medical action plan and red flags related to discharge diagnosis. Reviewed and educated them on any new and changed medications related to discharge diagnosis. Advised obtaining a 90-day supply of all daily and as-needed medications. Pt confirmed  Has new Rxs K+. Immodium, Protonix, & Mag-Ox & taking as prescribed. Pt confirmed d/c'd meds Multivit, D3 w/ Vit K, Pepcid, & ASA. Pt confirmed PCP [facility] f/u appt on 20. GI Dr Moon Maria office to call to schedule 2 week f/u. Education provided regarding infection prevention, and signs and symptoms of COVID-19 and when to seek medical attention with patient who verbalized understanding. Discussed exposure protocols and [facility] at higher risk for severe illness  and given an opportunity for questions and concerns. The patient agrees to contact the COVID-19 hotline [phone] or PCP office for questions related to their healthcare. CTN/ACM provided contact information for future reference. From CDC: Are you at higher risk for severe illness?  Wash your hands often.  Avoid close contact (6 feet, which is about two arm lengths) with people who are sick.  Put distance between yourself and other people if COVID-19 is spreading in your community.  Clean and disinfect frequently touched surfaces.  Avoid all cruise travel and non-essential air travel.  Call your healthcare professional if you have concerns about COVID-19 and your underlying condition or if you are sick.     For more information on steps you can take to protect yourself, see CDC's How to Protect Yourself      Patient/family/caregiver given information for GetWell Loop and agrees to enroll yes  Patient's preferred e-mail:  [email or web address]  Patient's preferred phone number: [phone]  Based on Loop alert triggers, patient will be contacted by nurse care manager for worsening symptoms. Pt will be further monitored by COVID Loop Team based on severity of symptoms and risk factors.

## 2020-04-18 LAB
BACTERIA SPEC CULT: NORMAL
SERVICE CMNT-IMP: NORMAL

## 2020-04-22 ENCOUNTER — VIRTUAL VISIT (OUTPATIENT)
Dept: INTERNAL MEDICINE CLINIC | Age: 65
End: 2020-04-22

## 2020-04-22 ENCOUNTER — TELEPHONE (OUTPATIENT)
Dept: INTERNAL MEDICINE CLINIC | Age: 65
End: 2020-04-22

## 2020-04-22 DIAGNOSIS — R73.01 IFG (IMPAIRED FASTING GLUCOSE): ICD-10-CM

## 2020-04-22 DIAGNOSIS — D50.9 IRON DEFICIENCY ANEMIA, UNSPECIFIED IRON DEFICIENCY ANEMIA TYPE: ICD-10-CM

## 2020-04-22 DIAGNOSIS — K92.0 HEMATEMESIS WITH NAUSEA: Primary | ICD-10-CM

## 2020-04-22 DIAGNOSIS — R79.89 ELEVATED LFTS: ICD-10-CM

## 2020-04-22 DIAGNOSIS — E78.2 MIXED HYPERLIPIDEMIA: ICD-10-CM

## 2020-04-22 DIAGNOSIS — Z78.9 STATIN INTOLERANCE: ICD-10-CM

## 2020-04-22 RX ORDER — EZETIMIBE 10 MG/1
10 TABLET ORAL DAILY
Qty: 30 TAB | Refills: 3 | Status: SHIPPED | OUTPATIENT
Start: 2020-04-22

## 2020-04-22 RX ORDER — AA/PROT/LYSINE/METHIO/VIT C/B6 50-12.5 MG
TABLET ORAL
COMMUNITY

## 2020-04-22 NOTE — PROGRESS NOTES
Chief Complaint   Patient presents with   [place], elevated LFT's and diarrhea, coughing up blood. 4-13-20 Through 4-16-20      1. Have you been to the ER, urgent care clinic since your last visit? Hospitalized since your last visit? Yes [facility], elevated LFTS, 4-13-20 through 4-16-20    2. Have you seen or consulted any other health care providers outside of the [address] [place] [address] since your last visit? Include any pap smears or colon screening.  No     Health Maintenance Due   Topic Date Due    Pneumococcal 0-64 years (1 of 1 - PPSV23) 09/07/1962    DTaP/Tdap/Td series (1 - Tdap) 09/07/1977    PAP AKA CERVICAL CYTOLOGY  09/07/1977    Shingrix Vaccine Age 50> (1 of 2) 09/07/2006    A1C test (Diabetic or Prediabetic)  12/04/2018    Breast Cancer Screen Mammogram  12/14/2019     3 most recent PHQ Screens 4/22/2020   Little interest or pleasure in doing things Not at all   Feeling down, depressed, irritable, or hopeless Not at all   Total Score PHQ 2 0
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
## 2020-05-27 ENCOUNTER — VIRTUAL VISIT (OUTPATIENT)
Dept: INTERNAL MEDICINE CLINIC | Age: 65
End: 2020-05-27

## 2020-05-27 DIAGNOSIS — R73.01 IFG (IMPAIRED FASTING GLUCOSE): ICD-10-CM

## 2020-05-27 DIAGNOSIS — E87.6 HYPOKALEMIA: ICD-10-CM

## 2020-05-27 DIAGNOSIS — D50.9 IRON DEFICIENCY ANEMIA, UNSPECIFIED IRON DEFICIENCY ANEMIA TYPE: ICD-10-CM

## 2020-05-27 DIAGNOSIS — R79.89 ELEVATED LFTS: ICD-10-CM

## 2020-05-27 DIAGNOSIS — R42 DIZZINESS: ICD-10-CM

## 2020-05-27 DIAGNOSIS — E78.2 MIXED HYPERLIPIDEMIA: ICD-10-CM

## 2020-05-27 DIAGNOSIS — K92.0 HEMATEMESIS WITH NAUSEA: Primary | ICD-10-CM

## 2020-05-27 DIAGNOSIS — D75.89 MACROCYTOSIS: ICD-10-CM

## 2020-05-27 NOTE — PROGRESS NOTES
[phone]    Chief Complaint   Patient presents with    Dizziness     5-23-20 vomitting, nausea, dizziness, altered balance. pt thinks she is having side effects from medications. stopped protonix and zetia. stopped monday. pt feels better       1. Have you been to the ER, urgent care clinic since your last visit? Hospitalized since your last visit? No    2. Have you seen or consulted any other health care providers outside of the [address] [place] [address] since your last visit? Include any pap smears or colon screening. No     Health Maintenance Due   Topic Date Due    Pneumococcal 0-64 years (1 of 1 - PPSV23) 09/07/1962    DTaP/Tdap/Td series (1 - Tdap) 09/07/1977    PAP AKA CERVICAL CYTOLOGY  09/07/1977    Shingrix Vaccine Age 50> (1 of 2) 09/07/2006    A1C test (Diabetic or Prediabetic)  12/04/2018    Breast Cancer Screen Mammogram  12/14/2019     3 most recent PHQ Screens 4/22/2020   Little interest or pleasure in doing things Not at all   Feeling down, depressed, irritable, or hopeless Not at all   Total Score PHQ 2 0     Recent Travel Screening and Travel History documentation     Travel Screening       Question Response     In the last month, have you been in contact with someone who was confirmed or suspected to have Coronavirus / COVID-19? No / Unsure     Do you have any of the following symptoms? None of these     Have you traveled internationally in the last month?  No      Travel History   Travel since 04/27/20     No documented travel since 04/27/20

## 2020-05-30 NOTE — PROGRESS NOTES
Subjective  Roque Faye is a 62 y.o. female presents for acute care     Roque Faye is a 62 y.o. female evaluated via audio only technology on 5/27/2020. Consent: She and/or her health care decision maker is aware that she may receive a bill for this audio only encounter, depending on her insurance coverage, and has provided verbal consent to proceed: Yes    I communicated with the patient and/or health care decision maker about the nature and details of the following:  Assessment & Plan:   Diagnoses and all orders for this visit:    1. Hematemesis with nausea  -     CBC WITH AUTOMATED DIFF    2. Iron deficiency anemia, unspecified iron deficiency anemia type  -     CBC WITH AUTOMATED DIFF  -     VITAMIN B12 & FOLATE    3. Mixed hyperlipidemia  -     LIPID PANEL    4. IFG (impaired fasting glucose)  -     METABOLIC PANEL, COMPREHENSIVE  -     HEMOGLOBIN A1C WITH EAG  -     URINALYSIS W/ REFLEX CULTURE    5. Elevated LFTs  -     HEPATIC FUNCTION PANEL    6. Macrocytosis  -     CBC WITH AUTOMATED DIFF  -     VITAMIN B12 & FOLATE    7. Dizziness  -     CBC WITH AUTOMATED DIFF    8. Hypokalemia  -     MAGNESIUM      The complexity of medical decision making for this visit is moderate   Follow-up and Dispositions    · Return if symptoms worsen or fail to improve. 12  Subjective:   Roque Faye is a 62 y.o. female who was seen for Dizziness (5-23-20 vomitting, nausea, dizziness, altered balance. pt thinks she is having side effects from medications. stopped protonix and zetia. stopped monday. pt feels better  )      Prior to Admission medications    Medication Sig Start Date End Date Taking? Authorizing Provider   zinc sulfate (ZINC-15 PO) Take  by mouth. Yes Provider, Historical   coenzyme q10 (Co Q-10) 10 mg cap Take  by mouth. Yes Provider, Historical   ascorbic acid, vitamin C, (Vitamin C) 500 mg tablet Take 1,000 mg by mouth daily.    Yes Other, MD Ramiro   cholecalciferol, vitamin D3, (Vitamin D3) 50 mcg (2,000 unit) tab Take  by mouth daily. Indications: vit d plus vit k2   Yes Other, MD Ramiro   ezetimibe (ZETIA) 10 mg tablet Take 1 Tab by mouth daily. 4/22/20   Merlin Hua, NP   multivit,iron,minerals/lutein (CENTRUM SILVER ULTRA WOMEN'S PO) Take  by mouth. 5/30/20  Provider, Historical   pantoprazole (Protonix) 40 mg tablet Take 1 Tab by mouth daily for 60 days. 4/16/20 5/30/20  Hans Garcia MD     Allergies   Allergen Reactions    Amoxicillin Hives       Patient Active Problem List   Diagnosis Code    Elevated blood pressure reading R03.0    Dupuytren's disease of palm M72.0    Ganglion of right wrist M67.431    Joint derangement, hand M24.9    IFG (impaired fasting glucose) R73.01    Elevated LFTs R79.89    Hematemesis K92.0    Nausea vomiting and diarrhea R11.2, R19.7    Alcohol abuse F10.10     Patient Active Problem List    Diagnosis Date Noted    Elevated LFTs 04/13/2020    Hematemesis 04/13/2020    Nausea vomiting and diarrhea 04/13/2020    Alcohol abuse 04/13/2020    Elevated blood pressure reading 12/04/2017    Dupuytren's disease of palm 12/04/2017    Ganglion of right wrist 12/04/2017    Joint derangement, hand 12/04/2017    IFG (impaired fasting glucose) 12/04/2017     Current Outpatient Medications   Medication Sig Dispense Refill    zinc sulfate (ZINC-15 PO) Take  by mouth.  coenzyme q10 (Co Q-10) 10 mg cap Take  by mouth.  ascorbic acid, vitamin C, (Vitamin C) 500 mg tablet Take 1,000 mg by mouth daily.  cholecalciferol, vitamin D3, (Vitamin D3) 50 mcg (2,000 unit) tab Take  by mouth daily. Indications: vit d plus vit k2      ezetimibe (ZETIA) 10 mg tablet Take 1 Tab by mouth daily.  30 Tab 3     Allergies   Allergen Reactions    Amoxicillin Hives     Past Medical History:   Diagnosis Date    Arthritis     osteoporosis    Other ill-defined conditions(799.89)     high cholesterol    Psychiatric disorder     anxious     Past Surgical History: Procedure Laterality Date    BREAST SURGERY PROCEDURE UNLISTED      HX BREAST BIOPSY Right     at age 25   [de-identified] GYN      hysterectomy    HX HYSTERECTOMY      HX OOPHORECTOMY       Social History     Tobacco Use    Smoking status: Never Smoker    Smokeless tobacco: Never Used   Substance Use Topics    Alcohol use: Yes     Comment: 4 glasses of wine in 1 month        Review of Systems   Constitutional: Negative. Respiratory: Negative. Cardiovascular: Negative. Gastrointestinal: Negative for abdominal pain, diarrhea, heartburn, nausea and vomiting. Genitourinary: Negative. Neurological: Negative for dizziness and headaches. I affirm this is a Patient-Initiated Episode with a Patient who has not had a related appointment within my department in the past 7 days or scheduled within the next 24 hours. Total Time: minutes: 11-20 minutes  She d  Note: not billable if this call serves to triage the patient into an appointment for the relevant concern      Carol Ann Gr NP   HPI   Patient with history of severe sepsis d/t viral gastroenteritis,  n/v, hematemesis, hypokalemia, hypomagnesia , acute transaminitis  admitted to hospital 4/16. Has had follow up with GI specialist since hospital discharged and started on Protonix   She reports vomiting, nausea dizziness for 2 days over the weekend   Reports symptoms unrelated to alcohol intake. She last drank on 4/22   Believed symptoms d/t ADR and she discontinued Zetia and Protonix . she has felt fine since   GERD \"not so bad\" and she wishes to stay off PPI and avoid triggers       Objective  There were no vitals taken for this visit. Physical Exam     Assessment & Plan    ICD-10-CM ICD-9-CM    1. Hematemesis with nausea K92.0 578.0 CBC WITH AUTOMATED DIFF     787.02    2. Iron deficiency anemia, unspecified iron deficiency anemia type D50.9 280.9 CBC WITH AUTOMATED DIFF      VITAMIN B12 & FOLATE   3.  Mixed hyperlipidemia E78.2 272.2 LIPID PANEL 4. IFG (impaired fasting glucose) E67.90 960.48 METABOLIC PANEL, COMPREHENSIVE      HEMOGLOBIN A1C WITH EAG      URINALYSIS W/ REFLEX CULTURE   5. Elevated LFTs R79.89 790.6 HEPATIC FUNCTION PANEL   6. Macrocytosis D75.89 289.89 CBC WITH AUTOMATED DIFF      VITAMIN B12 & FOLATE   7. Dizziness R42 780.4 CBC WITH AUTOMATED DIFF   8. Hypokalemia E87.6 276.8 MAGNESIUM     Follow-up and Dispositions    · Return if symptoms worsen or fail to improve. Possible ADRs, vs electrolyte abnormality vs recurrent viral gastroenteritis. Discussed need for labs,  none since hospital discharge  defer alternate cholesterol lowering medication pending lab review  Encouraged to avoid alcohol  lab results and schedule of future lab studies reviewed with patient  cardiovascular risk and specific lipid/LDL goals reviewed    Patient voices understanding and acceptance of this advice and will call back if any further questions or concerns.   Suri Mckeon, JOSELITO

## 2020-06-15 ENCOUNTER — PATIENT MESSAGE (OUTPATIENT)
Dept: INTERNAL MEDICINE CLINIC | Age: 65
End: 2020-06-15

## 2020-06-15 LAB
ALBUMIN SERPL-MCNC: 4.5 G/DL (ref 3.8–4.8)
ALBUMIN/GLOB SERPL: 1.5 {RATIO} (ref 1.2–2.2)
ALP SERPL-CCNC: 108 IU/L (ref 39–117)
ALT SERPL-CCNC: 52 IU/L (ref 0–32)
APPEARANCE UR: ABNORMAL
AST SERPL-CCNC: 60 IU/L (ref 0–40)
BACTERIA #/AREA URNS HPF: ABNORMAL /[HPF]
BACTERIA UR CULT: NORMAL
BASOPHILS # BLD AUTO: 0 X10E3/UL (ref 0–0.2)
BASOPHILS NFR BLD AUTO: 0 %
BILIRUB DIRECT SERPL-MCNC: 0.26 MG/DL (ref 0–0.4)
BILIRUB SERPL-MCNC: 0.7 MG/DL (ref 0–1.2)
BILIRUB UR QL STRIP: NEGATIVE
BUN SERPL-MCNC: 5 MG/DL (ref 8–27)
BUN/CREAT SERPL: 13 (ref 12–28)
CALCIUM SERPL-MCNC: 9.7 MG/DL (ref 8.7–10.3)
CASTS URNS MICRO: ABNORMAL
CASTS URNS QL MICRO: PRESENT /LPF
CHLORIDE SERPL-SCNC: 102 MMOL/L (ref 96–106)
CHOLEST SERPL-MCNC: 189 MG/DL (ref 100–199)
CO2 SERPL-SCNC: 25 MMOL/L (ref 20–29)
COLOR UR: YELLOW
CREAT SERPL-MCNC: 0.38 MG/DL (ref 0.57–1)
EOSINOPHIL # BLD AUTO: 0.1 X10E3/UL (ref 0–0.4)
EOSINOPHIL NFR BLD AUTO: 1 %
EPI CELLS #/AREA URNS HPF: ABNORMAL /HPF (ref 0–10)
ERYTHROCYTE [DISTWIDTH] IN BLOOD BY AUTOMATED COUNT: 12.6 % (ref 11.7–15.4)
EST. AVERAGE GLUCOSE BLD GHB EST-MCNC: 103 MG/DL
FOLATE SERPL-MCNC: 13.7 NG/ML
GLOBULIN SER CALC-MCNC: 3.1 G/DL (ref 1.5–4.5)
GLUCOSE SERPL-MCNC: 96 MG/DL (ref 65–99)
GLUCOSE UR QL: NEGATIVE
HBA1C MFR BLD: 5.2 % (ref 4.8–5.6)
HCT VFR BLD AUTO: 44 % (ref 34–46.6)
HDLC SERPL-MCNC: 47 MG/DL
HGB BLD-MCNC: 14.3 G/DL (ref 11.1–15.9)
HGB UR QL STRIP: NEGATIVE
IMM GRANULOCYTES # BLD AUTO: 0 X10E3/UL (ref 0–0.1)
IMM GRANULOCYTES NFR BLD AUTO: 0 %
KETONES UR QL STRIP: ABNORMAL
LDLC SERPL CALC-MCNC: 125 MG/DL (ref 0–99)
LEUKOCYTE ESTERASE UR QL STRIP: ABNORMAL
LYMPHOCYTES # BLD AUTO: 1.2 X10E3/UL (ref 0.7–3.1)
LYMPHOCYTES NFR BLD AUTO: 21 %
MAGNESIUM SERPL-MCNC: 1.8 MG/DL (ref 1.6–2.3)
MCH RBC QN AUTO: 30.8 PG (ref 26.6–33)
MCHC RBC AUTO-ENTMCNC: 32.5 G/DL (ref 31.5–35.7)
MCV RBC AUTO: 95 FL (ref 79–97)
MICRO URNS: ABNORMAL
MONOCYTES # BLD AUTO: 0.4 X10E3/UL (ref 0.1–0.9)
MONOCYTES NFR BLD AUTO: 7 %
MUCOUS THREADS URNS QL MICRO: PRESENT
NEUTROPHILS # BLD AUTO: 3.9 X10E3/UL (ref 1.4–7)
NEUTROPHILS NFR BLD AUTO: 71 %
NITRITE UR QL STRIP: NEGATIVE
PH UR STRIP: 5 [PH] (ref 5–7.5)
PLATELET # BLD AUTO: 230 X10E3/UL (ref 150–450)
POTASSIUM SERPL-SCNC: 4 MMOL/L (ref 3.5–5.2)
PROT SERPL-MCNC: 7.6 G/DL (ref 6–8.5)
PROT UR QL STRIP: ABNORMAL
RBC # BLD AUTO: 4.65 X10E6/UL (ref 3.77–5.28)
RBC #/AREA URNS HPF: ABNORMAL /HPF (ref 0–2)
SODIUM SERPL-SCNC: 143 MMOL/L (ref 134–144)
SP GR UR: 1.02 (ref 1–1.03)
TRIGL SERPL-MCNC: 86 MG/DL (ref 0–149)
URINALYSIS REFLEX, 377202: ABNORMAL
UROBILINOGEN UR STRIP-MCNC: 0.2 MG/DL (ref 0.2–1)
VIT B12 SERPL-MCNC: 747 PG/ML (ref 232–1245)
VLDLC SERPL CALC-MCNC: 17 MG/DL (ref 5–40)
WBC # BLD AUTO: 5.5 X10E3/UL (ref 3.4–10.8)
WBC #/AREA URNS HPF: ABNORMAL /HPF (ref 0–5)

## 2022-03-18 PROBLEM — R79.89 ELEVATED LFTS: Status: ACTIVE | Noted: 2020-04-13

## 2022-03-19 PROBLEM — K92.0 HEMATEMESIS: Status: ACTIVE | Noted: 2020-04-13

## 2022-03-19 PROBLEM — R11.2 NAUSEA VOMITING AND DIARRHEA: Status: ACTIVE | Noted: 2020-04-13

## 2022-03-19 PROBLEM — M72.0 DUPUYTREN'S DISEASE OF PALM: Status: ACTIVE | Noted: 2017-12-04

## 2022-03-19 PROBLEM — M24.9: Status: ACTIVE | Noted: 2017-12-04

## 2022-03-19 PROBLEM — R19.7 NAUSEA VOMITING AND DIARRHEA: Status: ACTIVE | Noted: 2020-04-13

## 2022-03-19 PROBLEM — R03.0 ELEVATED BLOOD PRESSURE READING: Status: ACTIVE | Noted: 2017-12-04

## 2022-03-19 PROBLEM — F10.10 ALCOHOL ABUSE: Status: ACTIVE | Noted: 2020-04-13

## 2022-03-19 PROBLEM — R73.01 IFG (IMPAIRED FASTING GLUCOSE): Status: ACTIVE | Noted: 2017-12-04

## 2022-03-20 PROBLEM — M67.431 GANGLION OF RIGHT WRIST: Status: ACTIVE | Noted: 2017-12-04

## 2025-01-30 ENCOUNTER — TELEPHONE (OUTPATIENT)
Age: 70
End: 2025-01-30

## 2025-01-30 NOTE — TELEPHONE ENCOUNTER
We received a referral from [facility] to set the patient up for a New Hematology Consult. I left a voicemail for the patient asking her to call me back so I can verify appointment details with her. Dr. Vicente wanted me to set her up for Labs prior to her visit and I wanted to go over all of that with her as well and make sure she is no longer seeing Dr. SILVERMAN and RAFAEL as well.    Thank you,

## 2025-01-31 PROBLEM — D69.6 THROMBOCYTOPENIA (HCC): Status: ACTIVE | Noted: 2025-01-31

## 2025-02-13 ENCOUNTER — HOSPITAL ENCOUNTER (OUTPATIENT)
Facility: HOSPITAL | Age: 70
Setting detail: INFUSION SERIES
Discharge: HOME | End: 2025-02-13
Payer: MEDICARE

## 2025-02-13 ENCOUNTER — OFFICE VISIT (OUTPATIENT)
Age: 70
End: 2025-02-13
Payer: MEDICARE

## 2025-02-13 VITALS
HEIGHT: 63 IN | SYSTOLIC BLOOD PRESSURE: 117 MMHG | WEIGHT: 152 LBS | DIASTOLIC BLOOD PRESSURE: 57 MMHG | BODY MASS INDEX: 26.93 KG/M2 | HEART RATE: 86 BPM | TEMPERATURE: 98.1 F | OXYGEN SATURATION: 96 %

## 2025-02-13 VITALS — WEIGHT: 151.3 LBS

## 2025-02-13 DIAGNOSIS — D69.6 THROMBOCYTOPENIA: Primary | ICD-10-CM

## 2025-02-13 LAB
BASOPHILS # BLD: 0.02 K/UL (ref 0–0.1)
BASOPHILS NFR BLD: 0.6 % (ref 0–1)
DIFFERENTIAL METHOD BLD: ABNORMAL
EOSINOPHIL # BLD: 0.04 K/UL (ref 0–0.4)
EOSINOPHIL NFR BLD: 1.2 % (ref 0–7)
ERYTHROCYTE [DISTWIDTH] IN BLOOD BY AUTOMATED COUNT: 20.5 % (ref 11.5–14.5)
HCT VFR BLD AUTO: 32.5 % (ref 35–47)
HGB BLD-MCNC: 10.7 G/DL (ref 11.5–16)
IMM GRANULOCYTES # BLD AUTO: 0.01 K/UL (ref 0–0.04)
IMM GRANULOCYTES NFR BLD AUTO: 0.3 % (ref 0–0.5)
LYMPHOCYTES # BLD: 0.42 K/UL (ref 0.8–3.5)
LYMPHOCYTES NFR BLD: 13 % (ref 12–49)
MCH RBC QN AUTO: 30.3 PG (ref 26–34)
MCHC RBC AUTO-ENTMCNC: 32.9 G/DL (ref 30–36.5)
MCV RBC AUTO: 92.1 FL (ref 80–99)
MONOCYTES # BLD: 0.37 K/UL (ref 0–1)
MONOCYTES NFR BLD: 11.5 % (ref 5–13)
NEUTS SEG # BLD: 2.34 K/UL (ref 1.8–8)
NEUTS SEG NFR BLD: 73.4 % (ref 32–75)
NRBC # BLD: 0 K/UL (ref 0–0.01)
NRBC BLD-RTO: 0 PER 100 WBC
PLATELET # BLD AUTO: 55 K/UL (ref 150–400)
PLATELET # BLD AUTO: 60 K/UL (ref 150–400)
PLATELET COMMENT: ABNORMAL
PMV BLD AUTO: 11.1 FL (ref 8.9–12.9)
RBC # BLD AUTO: 3.53 M/UL (ref 3.8–5.2)
RBC MORPH BLD: ABNORMAL
WBC # BLD AUTO: 3.2 K/UL (ref 3.6–11)

## 2025-02-13 PROCEDURE — 3017F COLORECTAL CA SCREEN DOC REV: CPT | Performed by: INTERNAL MEDICINE

## 2025-02-13 PROCEDURE — 85025 COMPLETE CBC W/AUTO DIFF WBC: CPT

## 2025-02-13 PROCEDURE — G8399 PT W/DXA RESULTS DOCUMENT: HCPCS | Performed by: INTERNAL MEDICINE

## 2025-02-13 PROCEDURE — 1126F AMNT PAIN NOTED NONE PRSNT: CPT | Performed by: INTERNAL MEDICINE

## 2025-02-13 PROCEDURE — 99203 OFFICE O/P NEW LOW 30 MIN: CPT | Performed by: INTERNAL MEDICINE

## 2025-02-13 PROCEDURE — 36415 COLL VENOUS BLD VENIPUNCTURE: CPT

## 2025-02-13 PROCEDURE — G8428 CUR MEDS NOT DOCUMENT: HCPCS | Performed by: INTERNAL MEDICINE

## 2025-02-13 PROCEDURE — G8419 CALC BMI OUT NRM PARAM NOF/U: HCPCS | Performed by: INTERNAL MEDICINE

## 2025-02-13 PROCEDURE — 1123F ACP DISCUSS/DSCN MKR DOCD: CPT | Performed by: INTERNAL MEDICINE

## 2025-02-13 PROCEDURE — 1090F PRES/ABSN URINE INCON ASSESS: CPT | Performed by: INTERNAL MEDICINE

## 2025-02-13 PROCEDURE — 1036F TOBACCO NON-USER: CPT | Performed by: INTERNAL MEDICINE

## 2025-02-13 PROCEDURE — 85049 AUTOMATED PLATELET COUNT: CPT

## 2025-02-13 RX ORDER — LOSARTAN POTASSIUM 50 MG/1
TABLET ORAL
COMMUNITY
Start: 2025-01-01

## 2025-02-13 RX ORDER — POTASSIUM CHLORIDE 750 MG/1
TABLET, EXTENDED RELEASE ORAL
COMMUNITY
Start: 2024-08-28

## 2025-02-13 RX ORDER — PANTOPRAZOLE SODIUM 40 MG/10ML
40 INJECTION, POWDER, LYOPHILIZED, FOR SOLUTION INTRAVENOUS DAILY
COMMUNITY

## 2025-02-13 NOTE — PROGRESS NOTES
[facility] Lab Draw Note:  Arrived - 1505    Labs drawn peripherally from left arm - CBC with diff and Platelets in citrated tube    Recent Results (from the past 12 hour(s))   Platelet Count for Known Clumping    Collection Time: 02/13/25  3:08 PM   Result Value Ref Range    Platelet Count (Na Citrate) 55 (L) 150 - 400 K/uL   CBC with Auto Differential    Collection Time: 02/13/25  3:09 PM   Result Value Ref Range    WBC 3.2 (L) 3.6 - 11.0 K/uL    RBC 3.53 (L) 3.80 - 5.20 M/uL    Hemoglobin 10.7 (L) 11.5 - 16.0 g/dL    Hematocrit 32.5 (L) 35.0 - 47.0 %    MCV 92.1 80.0 - 99.0 FL    MCH 30.3 26.0 - 34.0 PG    MCHC 32.9 30.0 - 36.5 g/dL    RDW 20.5 (H) 11.5 - 14.5 %    Platelets 60 (L) 150 - 400 K/uL    MPV 11.1 8.9 - 12.9 FL    Nucleated RBCs 0.0 0  WBC    nRBC 0.00 0.00 - 0.01 K/uL    Neutrophils % PENDING %    Lymphocytes % PENDING %    Monocytes % PENDING %    Eosinophils % PENDING %    Basophils % PENDING %    Immature Granulocytes % PENDING %    Neutrophils Absolute PENDING K/UL    Lymphocytes Absolute PENDING K/UL    Monocytes Absolute PENDING K/UL    Eosinophils Absolute PENDING K/UL    Basophils Absolute PENDING K/UL    Immature Granulocytes Absolute PENDING K/UL    Differential Type PENDING        1515 - Tolerated well.  Pt denies any acute problems/changes. Discharged from [facility] ambulatory. No distress.       See EPIC for pending lab results.

## 2025-08-21 ENCOUNTER — HOSPITAL ENCOUNTER (EMERGENCY)
Facility: HOSPITAL | Age: 70
Discharge: HOME | End: 2025-08-21
Payer: MEDICARE

## 2025-08-21 VITALS
SYSTOLIC BLOOD PRESSURE: 182 MMHG | RESPIRATION RATE: 18 BRPM | BODY MASS INDEX: 28.34 KG/M2 | WEIGHT: 160 LBS | HEART RATE: 92 BPM | TEMPERATURE: 98.1 F | OXYGEN SATURATION: 98 % | DIASTOLIC BLOOD PRESSURE: 82 MMHG

## 2025-08-21 DIAGNOSIS — I10 PRIMARY HYPERTENSION: Primary | ICD-10-CM

## 2025-08-21 LAB
ALBUMIN SERPL-MCNC: 3.1 G/DL (ref 3.5–5.2)
ALBUMIN/GLOB SERPL: 0.6 (ref 1.1–2.2)
ALP SERPL-CCNC: 123 U/L (ref 35–104)
ALT SERPL-CCNC: 37 U/L (ref 10–35)
ANION GAP SERPL CALC-SCNC: 12 MMOL/L (ref 2–14)
AST SERPL-CCNC: 107 U/L (ref 10–35)
BASOPHILS # BLD: 0.01 K/UL (ref 0–0.1)
BASOPHILS NFR BLD: 0.4 % (ref 0–1)
BILIRUB SERPL-MCNC: 1.5 MG/DL (ref 0–1.2)
BUN SERPL-MCNC: 7 MG/DL (ref 8–23)
BUN/CREAT SERPL: 17 (ref 12–20)
CALCIUM SERPL-MCNC: 8.9 MG/DL (ref 8.8–10.2)
CHLORIDE SERPL-SCNC: 103 MMOL/L (ref 98–107)
CO2 SERPL-SCNC: 24 MMOL/L (ref 20–29)
CREAT SERPL-MCNC: 0.42 MG/DL (ref 0.6–1)
DIFFERENTIAL METHOD BLD: ABNORMAL
EOSINOPHIL # BLD: 0.03 K/UL (ref 0–0.4)
EOSINOPHIL NFR BLD: 1.1 % (ref 0–7)
ERYTHROCYTE [DISTWIDTH] IN BLOOD BY AUTOMATED COUNT: 18.4 % (ref 11.5–14.5)
GLOBULIN SER CALC-MCNC: 4.9 G/DL (ref 2–4)
GLUCOSE SERPL-MCNC: 114 MG/DL (ref 65–100)
HCT VFR BLD AUTO: 33.3 % (ref 35–47)
HGB BLD-MCNC: 10.2 G/DL (ref 11.5–16)
IMM GRANULOCYTES # BLD AUTO: 0.01 K/UL (ref 0–0.04)
IMM GRANULOCYTES NFR BLD AUTO: 0.4 % (ref 0–0.5)
LYMPHOCYTES # BLD: 0.56 K/UL (ref 0.8–3.5)
LYMPHOCYTES NFR BLD: 20.9 % (ref 12–49)
MCH RBC QN AUTO: 28 PG (ref 26–34)
MCHC RBC AUTO-ENTMCNC: 30.6 G/DL (ref 30–36.5)
MCV RBC AUTO: 91.5 FL (ref 80–99)
MONOCYTES # BLD: 0.31 K/UL (ref 0–1)
MONOCYTES NFR BLD: 11.6 % (ref 5–13)
NEUTS SEG # BLD: 1.78 K/UL (ref 1.8–8)
NEUTS SEG NFR BLD: 65.6 % (ref 32–75)
NRBC # BLD: 0 K/UL (ref 0–0.01)
NRBC BLD-RTO: 0 PER 100 WBC
PLATELET # BLD AUTO: 68 K/UL (ref 150–400)
PMV BLD AUTO: 10.1 FL (ref 8.9–12.9)
POTASSIUM SERPL-SCNC: 3.5 MMOL/L (ref 3.5–5.1)
PROT SERPL-MCNC: 8 G/DL (ref 6.4–8.3)
RBC # BLD AUTO: 3.64 M/UL (ref 3.8–5.2)
RBC MORPH BLD: ABNORMAL
SODIUM SERPL-SCNC: 139 MMOL/L (ref 136–145)
WBC # BLD AUTO: 2.7 K/UL (ref 3.6–11)

## 2025-08-21 PROCEDURE — 36415 COLL VENOUS BLD VENIPUNCTURE: CPT

## 2025-08-21 PROCEDURE — 99283 EMERGENCY DEPT VISIT LOW MDM: CPT

## 2025-08-21 PROCEDURE — 85025 COMPLETE CBC W/AUTO DIFF WBC: CPT

## 2025-08-21 PROCEDURE — 80053 COMPREHEN METABOLIC PANEL: CPT
